# Patient Record
Sex: FEMALE | Race: WHITE | Employment: OTHER | URBAN - METROPOLITAN AREA
[De-identification: names, ages, dates, MRNs, and addresses within clinical notes are randomized per-mention and may not be internally consistent; named-entity substitution may affect disease eponyms.]

---

## 2024-03-28 ENCOUNTER — HOSPITAL ENCOUNTER (INPATIENT)
Age: 29
LOS: 13 days | Discharge: HOME | DRG: 750 | End: 2024-04-10
Attending: EMERGENCY MEDICINE | Admitting: PSYCHIATRY & NEUROLOGY
Payer: MEDICAID

## 2024-03-28 DIAGNOSIS — R45.851 DEPRESSION WITH SUICIDAL IDEATION: Primary | ICD-10-CM

## 2024-03-28 DIAGNOSIS — F32.A DEPRESSION WITH SUICIDAL IDEATION: Primary | ICD-10-CM

## 2024-03-28 PROBLEM — F25.1 SCHIZOAFFECTIVE DISORDER, DEPRESSIVE TYPE (HCC): Status: ACTIVE | Noted: 2024-03-28

## 2024-03-28 PROBLEM — K21.9 GASTROESOPHAGEAL REFLUX DISEASE WITHOUT ESOPHAGITIS: Status: ACTIVE | Noted: 2024-03-28

## 2024-03-28 PROBLEM — E66.9 OBESITY (BMI 30-39.9): Status: ACTIVE | Noted: 2024-03-28

## 2024-03-28 PROBLEM — K76.0 FATTY LIVER: Status: ACTIVE | Noted: 2024-03-28

## 2024-03-28 PROBLEM — R94.5 ABNORMAL LIVER FUNCTION: Status: ACTIVE | Noted: 2024-03-28

## 2024-03-28 PROBLEM — E03.8 OTHER SPECIFIED HYPOTHYROIDISM: Status: ACTIVE | Noted: 2024-03-28

## 2024-03-28 LAB
ALBUMIN SERPL-MCNC: 4.4 G/DL (ref 3.5–5.2)
ALP SERPL-CCNC: 91 U/L (ref 35–104)
ALT SERPL-CCNC: 61 U/L (ref 5–33)
AMPHET UR QL SCN: NEGATIVE
ANION GAP SERPL CALCULATED.3IONS-SCNC: 12 MMOL/L (ref 9–17)
AST SERPL-CCNC: 33 U/L
BARBITURATES UR QL SCN: NEGATIVE
BASOPHILS # BLD: 0.1 K/UL (ref 0–0.2)
BASOPHILS NFR BLD: 1 % (ref 0–2)
BENZODIAZ UR QL: NEGATIVE
BILIRUB SERPL-MCNC: 0.2 MG/DL (ref 0.3–1.2)
BUN SERPL-MCNC: 15 MG/DL (ref 6–20)
CALCIUM SERPL-MCNC: 9 MG/DL (ref 8.6–10.4)
CANNABINOIDS UR QL SCN: NEGATIVE
CHLORIDE SERPL-SCNC: 102 MMOL/L (ref 98–107)
CO2 SERPL-SCNC: 26 MMOL/L (ref 20–31)
COCAINE UR QL SCN: NEGATIVE
CREAT SERPL-MCNC: 0.7 MG/DL (ref 0.5–0.9)
EOSINOPHIL # BLD: 0.1 K/UL (ref 0–0.4)
EOSINOPHILS RELATIVE PERCENT: 1 % (ref 0–4)
ERYTHROCYTE [DISTWIDTH] IN BLOOD BY AUTOMATED COUNT: 13.4 % (ref 11.5–14.9)
ETHANOL PERCENT: <0.01 %
ETHANOLAMINE SERPL-MCNC: <10 MG/DL
FENTANYL UR QL: NEGATIVE
GFR SERPL CREATININE-BSD FRML MDRD: >90 ML/MIN/1.73M2
GLUCOSE SERPL-MCNC: 92 MG/DL (ref 70–99)
HCT VFR BLD AUTO: 40.3 % (ref 36–46)
HGB BLD-MCNC: 13 G/DL (ref 12–16)
LYMPHOCYTES NFR BLD: 2.1 K/UL (ref 1–4.8)
LYMPHOCYTES RELATIVE PERCENT: 22 % (ref 24–44)
MCH RBC QN AUTO: 26.1 PG (ref 26–34)
MCHC RBC AUTO-ENTMCNC: 32.2 G/DL (ref 31–37)
MCV RBC AUTO: 81.1 FL (ref 80–100)
METHADONE UR QL: NEGATIVE
MONOCYTES NFR BLD: 0.8 K/UL (ref 0.1–1.3)
MONOCYTES NFR BLD: 8 % (ref 1–7)
NEUTROPHILS NFR BLD: 68 % (ref 36–66)
NEUTS SEG NFR BLD: 6.6 K/UL (ref 1.3–9.1)
OPIATES UR QL SCN: NEGATIVE
OXYCODONE UR QL SCN: NEGATIVE
PCP UR QL SCN: NEGATIVE
PLATELET # BLD AUTO: 263 K/UL (ref 150–450)
PMV BLD AUTO: 7.5 FL (ref 6–12)
POTASSIUM SERPL-SCNC: 3.8 MMOL/L (ref 3.7–5.3)
PROT SERPL-MCNC: 7.5 G/DL (ref 6.4–8.3)
RBC # BLD AUTO: 4.98 M/UL (ref 4–5.2)
SODIUM SERPL-SCNC: 140 MMOL/L (ref 135–144)
TEST INFORMATION: NORMAL
WBC OTHER # BLD: 9.8 K/UL (ref 3.5–11)

## 2024-03-28 PROCEDURE — 99285 EMERGENCY DEPT VISIT HI MDM: CPT

## 2024-03-28 PROCEDURE — 90792 PSYCH DIAG EVAL W/MED SRVCS: CPT | Performed by: PSYCHIATRY & NEUROLOGY

## 2024-03-28 PROCEDURE — 80307 DRUG TEST PRSMV CHEM ANLYZR: CPT

## 2024-03-28 PROCEDURE — 80053 COMPREHEN METABOLIC PANEL: CPT

## 2024-03-28 PROCEDURE — 99222 1ST HOSP IP/OBS MODERATE 55: CPT | Performed by: INTERNAL MEDICINE

## 2024-03-28 PROCEDURE — 85025 COMPLETE CBC W/AUTO DIFF WBC: CPT

## 2024-03-28 PROCEDURE — GZHZZZZ GROUP PSYCHOTHERAPY: ICD-10-PCS | Performed by: PSYCHIATRY & NEUROLOGY

## 2024-03-28 PROCEDURE — GZ56ZZZ INDIVIDUAL PSYCHOTHERAPY, SUPPORTIVE: ICD-10-PCS | Performed by: PSYCHIATRY & NEUROLOGY

## 2024-03-28 PROCEDURE — 6370000000 HC RX 637 (ALT 250 FOR IP): Performed by: NURSE PRACTITIONER

## 2024-03-28 PROCEDURE — 1240000000 HC EMOTIONAL WELLNESS R&B

## 2024-03-28 PROCEDURE — G0480 DRUG TEST DEF 1-7 CLASSES: HCPCS

## 2024-03-28 PROCEDURE — 6370000000 HC RX 637 (ALT 250 FOR IP): Performed by: PSYCHIATRY & NEUROLOGY

## 2024-03-28 PROCEDURE — 36415 COLL VENOUS BLD VENIPUNCTURE: CPT

## 2024-03-28 PROCEDURE — 84443 ASSAY THYROID STIM HORMONE: CPT

## 2024-03-28 PROCEDURE — 84439 ASSAY OF FREE THYROXINE: CPT

## 2024-03-28 PROCEDURE — APPSS60 APP SPLIT SHARED TIME 46-60 MINUTES: Performed by: NURSE PRACTITIONER

## 2024-03-28 RX ORDER — CYCLOBENZAPRINE HCL 10 MG
10 TABLET ORAL 3 TIMES DAILY PRN
Status: ON HOLD | COMMUNITY
End: 2024-04-10

## 2024-03-28 RX ORDER — TRAZODONE HYDROCHLORIDE 50 MG/1
50 TABLET ORAL NIGHTLY PRN
Status: DISCONTINUED | OUTPATIENT
Start: 2024-03-28 | End: 2024-04-10 | Stop reason: HOSPADM

## 2024-03-28 RX ORDER — LEVOTHYROXINE SODIUM 0.05 MG/1
50 TABLET ORAL DAILY
Status: ON HOLD | COMMUNITY
End: 2024-04-09

## 2024-03-28 RX ORDER — OLANZAPINE 15 MG/1
15 TABLET ORAL NIGHTLY
Status: ON HOLD | COMMUNITY
End: 2024-04-09

## 2024-03-28 RX ORDER — DIPHENHYDRAMINE HYDROCHLORIDE 50 MG/ML
50 INJECTION INTRAMUSCULAR; INTRAVENOUS EVERY 4 HOURS PRN
Status: DISCONTINUED | OUTPATIENT
Start: 2024-03-28 | End: 2024-04-10 | Stop reason: HOSPADM

## 2024-03-28 RX ORDER — CYCLOBENZAPRINE HCL 10 MG
10 TABLET ORAL 3 TIMES DAILY PRN
Status: DISCONTINUED | OUTPATIENT
Start: 2024-03-28 | End: 2024-04-10 | Stop reason: HOSPADM

## 2024-03-28 RX ORDER — MAGNESIUM HYDROXIDE/ALUMINUM HYDROXICE/SIMETHICONE 120; 1200; 1200 MG/30ML; MG/30ML; MG/30ML
30 SUSPENSION ORAL EVERY 6 HOURS PRN
Status: DISCONTINUED | OUTPATIENT
Start: 2024-03-28 | End: 2024-04-10 | Stop reason: HOSPADM

## 2024-03-28 RX ORDER — TRAZODONE HYDROCHLORIDE 50 MG/1
50 TABLET ORAL NIGHTLY PRN
Status: ON HOLD | COMMUNITY
End: 2024-04-09

## 2024-03-28 RX ORDER — VITAMIN B COMPLEX
2000 TABLET ORAL EVERY MORNING
Status: DISCONTINUED | OUTPATIENT
Start: 2024-03-28 | End: 2024-04-10 | Stop reason: HOSPADM

## 2024-03-28 RX ORDER — ACETAMINOPHEN 325 MG/1
650 TABLET ORAL EVERY 6 HOURS PRN
Status: ON HOLD | COMMUNITY
End: 2024-04-10 | Stop reason: HOSPADM

## 2024-03-28 RX ORDER — LEVOTHYROXINE SODIUM 0.05 MG/1
50 TABLET ORAL DAILY
Status: DISCONTINUED | OUTPATIENT
Start: 2024-03-28 | End: 2024-04-10 | Stop reason: HOSPADM

## 2024-03-28 RX ORDER — HALOPERIDOL 5 MG/ML
5 INJECTION INTRAMUSCULAR EVERY 4 HOURS PRN
Status: DISCONTINUED | OUTPATIENT
Start: 2024-03-28 | End: 2024-04-10 | Stop reason: HOSPADM

## 2024-03-28 RX ORDER — SERTRALINE HYDROCHLORIDE 100 MG/1
100 TABLET, FILM COATED ORAL DAILY
Status: DISCONTINUED | OUTPATIENT
Start: 2024-03-28 | End: 2024-04-10 | Stop reason: HOSPADM

## 2024-03-28 RX ORDER — OLANZAPINE 10 MG/1
10 TABLET ORAL EVERY MORNING
Status: DISCONTINUED | OUTPATIENT
Start: 2024-03-29 | End: 2024-04-10 | Stop reason: HOSPADM

## 2024-03-28 RX ORDER — MULTIVIT-MIN/IRON/FOLIC ACID/K 18-600-40
1 CAPSULE ORAL EVERY MORNING
Status: ON HOLD | COMMUNITY
End: 2024-04-10 | Stop reason: HOSPADM

## 2024-03-28 RX ORDER — HYDROXYZINE 50 MG/1
50 TABLET, FILM COATED ORAL 3 TIMES DAILY PRN
Status: DISCONTINUED | OUTPATIENT
Start: 2024-03-28 | End: 2024-04-10 | Stop reason: HOSPADM

## 2024-03-28 RX ORDER — OMEPRAZOLE 20 MG/1
20 CAPSULE, DELAYED RELEASE ORAL DAILY
Status: ON HOLD | COMMUNITY
End: 2024-04-09

## 2024-03-28 RX ORDER — HALOPERIDOL 5 MG/1
5 TABLET ORAL EVERY 4 HOURS PRN
Status: DISCONTINUED | OUTPATIENT
Start: 2024-03-28 | End: 2024-04-10 | Stop reason: HOSPADM

## 2024-03-28 RX ORDER — POLYETHYLENE GLYCOL 3350 17 G/17G
17 POWDER, FOR SOLUTION ORAL DAILY PRN
Status: DISCONTINUED | OUTPATIENT
Start: 2024-03-28 | End: 2024-04-10 | Stop reason: HOSPADM

## 2024-03-28 RX ORDER — NORGESTIMATE AND ETHINYL ESTRADIOL 0.25-0.035
1 KIT ORAL DAILY
Status: DISCONTINUED | OUTPATIENT
Start: 2024-03-28 | End: 2024-03-29 | Stop reason: RX

## 2024-03-28 RX ORDER — SIMETHICONE 80 MG
80 TABLET,CHEWABLE ORAL 2 TIMES DAILY
Status: DISCONTINUED | OUTPATIENT
Start: 2024-03-28 | End: 2024-04-10 | Stop reason: HOSPADM

## 2024-03-28 RX ORDER — LIDOCAINE 4 G/G
1 PATCH TOPICAL DAILY
Status: DISCONTINUED | OUTPATIENT
Start: 2024-03-28 | End: 2024-04-10 | Stop reason: HOSPADM

## 2024-03-28 RX ORDER — OLANZAPINE 15 MG/1
15 TABLET ORAL NIGHTLY
Status: DISCONTINUED | OUTPATIENT
Start: 2024-03-28 | End: 2024-04-10 | Stop reason: HOSPADM

## 2024-03-28 RX ORDER — SERTRALINE HYDROCHLORIDE 100 MG/1
100 TABLET, FILM COATED ORAL DAILY
Status: ON HOLD | COMMUNITY
End: 2024-04-09

## 2024-03-28 RX ORDER — NORGESTIMATE AND ETHINYL ESTRADIOL 0.25-0.035
1 KIT ORAL DAILY
COMMUNITY

## 2024-03-28 RX ORDER — IBUPROFEN 400 MG/1
400 TABLET ORAL EVERY 6 HOURS PRN
Status: DISCONTINUED | OUTPATIENT
Start: 2024-03-28 | End: 2024-03-30

## 2024-03-28 RX ORDER — SIMETHICONE 80 MG
80 TABLET,CHEWABLE ORAL 2 TIMES DAILY
Status: ON HOLD | COMMUNITY
End: 2024-04-09

## 2024-03-28 RX ORDER — LORAZEPAM 2 MG/ML
2 INJECTION INTRAMUSCULAR EVERY 4 HOURS PRN
Status: DISCONTINUED | OUTPATIENT
Start: 2024-03-28 | End: 2024-04-04

## 2024-03-28 RX ORDER — LORAZEPAM 1 MG/1
2 TABLET ORAL EVERY 4 HOURS PRN
Status: DISCONTINUED | OUTPATIENT
Start: 2024-03-28 | End: 2024-04-04

## 2024-03-28 RX ORDER — OLANZAPINE 10 MG/1
10 TABLET ORAL EVERY MORNING
Status: ON HOLD | COMMUNITY
End: 2024-04-09

## 2024-03-28 RX ORDER — ACETAMINOPHEN 325 MG/1
650 TABLET ORAL EVERY 4 HOURS PRN
Status: DISCONTINUED | OUTPATIENT
Start: 2024-03-28 | End: 2024-04-10 | Stop reason: HOSPADM

## 2024-03-28 RX ORDER — BISACODYL 5 MG/1
5 TABLET, DELAYED RELEASE ORAL DAILY PRN
Status: ON HOLD | COMMUNITY
End: 2024-04-10 | Stop reason: HOSPADM

## 2024-03-28 RX ORDER — PANTOPRAZOLE SODIUM 40 MG/1
40 TABLET, DELAYED RELEASE ORAL
Status: DISCONTINUED | OUTPATIENT
Start: 2024-03-29 | End: 2024-04-10 | Stop reason: HOSPADM

## 2024-03-28 RX ADMIN — HYDROXYZINE HYDROCHLORIDE 50 MG: 50 TABLET, FILM COATED ORAL at 09:52

## 2024-03-28 RX ADMIN — HYDROXYZINE HYDROCHLORIDE 50 MG: 50 TABLET, FILM COATED ORAL at 20:49

## 2024-03-28 RX ADMIN — Medication 2000 UNITS: at 14:39

## 2024-03-28 RX ADMIN — LEVOTHYROXINE SODIUM 50 MCG: 0.05 TABLET ORAL at 14:39

## 2024-03-28 RX ADMIN — SERTRALINE 100 MG: 100 TABLET, FILM COATED ORAL at 17:32

## 2024-03-28 RX ADMIN — SIMETHICONE 80 MG: 80 TABLET, CHEWABLE ORAL at 20:52

## 2024-03-28 RX ADMIN — ACETAMINOPHEN 650 MG: 325 TABLET ORAL at 20:50

## 2024-03-28 RX ADMIN — OLANZAPINE 15 MG: 15 TABLET, FILM COATED ORAL at 20:52

## 2024-03-28 RX ADMIN — SIMETHICONE 80 MG: 80 TABLET, CHEWABLE ORAL at 14:40

## 2024-03-28 ASSESSMENT — PATIENT HEALTH QUESTIONNAIRE - PHQ9
2. FEELING DOWN, DEPRESSED OR HOPELESS: NOT AT ALL
SUM OF ALL RESPONSES TO PHQ QUESTIONS 1-9: 0
1. LITTLE INTEREST OR PLEASURE IN DOING THINGS: NOT AT ALL
SUM OF ALL RESPONSES TO PHQ QUESTIONS 1-9: 0
SUM OF ALL RESPONSES TO PHQ9 QUESTIONS 1 & 2: 0

## 2024-03-28 ASSESSMENT — LIFESTYLE VARIABLES
HOW MANY STANDARD DRINKS CONTAINING ALCOHOL DO YOU HAVE ON A TYPICAL DAY: PATIENT DOES NOT DRINK
HOW MANY STANDARD DRINKS CONTAINING ALCOHOL DO YOU HAVE ON A TYPICAL DAY: PATIENT DOES NOT DRINK
HOW OFTEN DO YOU HAVE A DRINK CONTAINING ALCOHOL: NEVER
HOW OFTEN DO YOU HAVE A DRINK CONTAINING ALCOHOL: NEVER

## 2024-03-28 ASSESSMENT — PAIN DESCRIPTION - DESCRIPTORS: DESCRIPTORS: ACHING;DISCOMFORT

## 2024-03-28 ASSESSMENT — PAIN DESCRIPTION - ORIENTATION: ORIENTATION: LOWER

## 2024-03-28 ASSESSMENT — PAIN - FUNCTIONAL ASSESSMENT
PAIN_FUNCTIONAL_ASSESSMENT: ACTIVITIES ARE NOT PREVENTED
PAIN_FUNCTIONAL_ASSESSMENT: NONE - DENIES PAIN

## 2024-03-28 ASSESSMENT — SLEEP AND FATIGUE QUESTIONNAIRES
AVERAGE NUMBER OF SLEEP HOURS: 6
DO YOU HAVE DIFFICULTY SLEEPING: NO
DO YOU USE A SLEEP AID: NO

## 2024-03-28 ASSESSMENT — PAIN SCALES - GENERAL
PAINLEVEL_OUTOF10: 3
PAINLEVEL_OUTOF10: 10

## 2024-03-28 ASSESSMENT — PAIN DESCRIPTION - LOCATION: LOCATION: BACK
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
## 2024-03-28 NOTE — BH NOTE
Patient is agitated as evidence of hitting self and banging on wall, writer attempted to help refocus agitation on other activities, patient remained calm, smiling and hitting self at times. writer offered PRN 's and talk time. Patient is in dayroom redirected with talk and laughing with staff

## 2024-03-28 NOTE — ED NOTES
Writer received phone call from guardian who provided verbal consent for inpatient admission to [facility].  Guardian aware of patient being placed on application for emergency admission.

## 2024-03-28 NOTE — BH NOTE
Emergency PRN Medication Administration Note:      Patient is Agitated as evidence by hitting self. Staff attempted to find and relieve the distress by Refocusing on new activity Patient is currently  in behavioral  control and coloring Medication Administered as prescribed: Atarax 50 mg PO Patient Tolerated medication administration.   Will continue to monitor, offer support, and reassess.

## 2024-03-28 NOTE — CARE COORDINATION
SOCIAL SERVICE NOTE:  e-mails patient admission paperwork to guardian Billant Rock at [email or web address]  For signature.  speaks with patient's care giver through [facility] [employer]. 24 hour staffing is through [employer], contact is Doris [phone] Ext [phone], requesting a 24 hour notice prior to discharge.

## 2024-03-28 NOTE — BH NOTE
Emergency Medication Follow-Up Note:    PRN medication of Atarax 50 mg  was effective as evidence by  Patient regain behavioral control, absence of behavior warranting emergency medication, socializing with peers. Patient denies medication side effects. Will continue to monitor and provide support as needed.
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
## 2024-03-28 NOTE — H&P
Department of Psychiatry  Attending Physician Psychiatric Assessment     Reason for Admission to Psychiatric Unit:  Threat to self requiring 24 hour professional observation  Threat to others requiring 24 hour professional observation  Command hallucinations directing harm to self or others; risk of the patient taking action  Failure of outpatient psychiatry treatment so that the beneficiary requires 24 hour professional observation and care  Concerns about patient's safety in the community    CHIEF COMPLAINT:  Suicidal and homicidal ideation with command auditory hallucinations    History obtained from: Patient, electronic medical record          HISTORY OF PRESENT ILLNESS:    Esmer Monzon is a 28 y.o. female who has a past medical history of schizoaffective disorder and intellectual disability. Patient presented to the ED [employer] application for emergency admission.  Documentation states that patient was experiencing command auditory hallucinations.  Making homicidal statements towards staff and was being aggressive.  Also cut her left forearm and has lacerations on this arm.  Patient unable to contract for safety in the community.  She does have a guardian, Jennifer Rock [phone].    On assessment, patient is sitting in the day area and is cooperative with discussion.  She exhibits significant thought blocking.  Has poor recall of recent and remote events.  Patient is oriented to self, month, year, and place.  She notes that she is admitted to the hospital after becoming distressed yesterday, banging on walls, throwing her shoes, and harming herself.  Patient unable to identify any specific trigger and states that she was acting on the voices that were commanding her to do these things.  Patient states that the voices are distressing, and have been telling her to end her life by wrapping a blanket around her neck.  Patient does note that she has attempted to harm herself in the past.  She is not able 
extremities      Physical Exam:     /88   Pulse 96   Temp 97.1 °F (36.2 °C) (Temporal)   Resp 14   Ht 1.626 m (5' 4.02\")   Wt 90.7 kg (200 lb)   SpO2 99%   BMI 34.31 kg/m²   Temp (24hrs), Av.7 °F (36.5 °C), Min:97.1 °F (36.2 °C), Max:98.2 °F (36.8 °C)    No results for input(s): \"POCGLU\" in the last 72 hours.  No intake or output data in the 24 hours ending 24 1617    General Appearance:  alert, well appearing, and in no acute distress  Mental status: oriented to person, place, and time   Head:  normocephalic, atraumatic.  Neck: supple, no carotid bruits, thyroid not palpable  Lungs: Bilateral equal air entry, clear to ausculation, no wheezing, rales or rhonchi, normal effort  Cardiovascular: normal rate, regular rhythm, no murmur, gallop, rub.  Abdomen: Soft, nontender, nondistended, normal bowel sounds, no hepatomegaly or splenomegaly  Neurologic: There are no new focal motor or sensory deficits, moving all extremities spontaneously   Skin: No gross lesions, rashes, bruising or bleeding on exposed skin area  Extremities:  peripheral pulses palpable, no pedal edema or calf pain with palpation    Investigations:      Laboratory Testing:  Recent Results (from the past 24 hour(s))   CBC with Auto Differential    Collection Time: 24  1:15 AM   Result Value Ref Range    WBC 9.8 3.5 - 11.0 k/uL    RBC 4.98 4.0 - 5.2 m/uL    Hemoglobin 13.0 12.0 - 16.0 g/dL    Hematocrit 40.3 36 - 46 %    MCV 81.1 80 - 100 fL    MCH 26.1 26 - 34 pg    MCHC 32.2 31 - 37 g/dL    RDW 13.4 11.5 - 14.9 %    Platelets 263 150 - 450 k/uL    MPV 7.5 6.0 - 12.0 fL    Neutrophils % 68 (H) 36 - 66 %    Lymphocytes % 22 (L) 24 - 44 %    Monocytes % 8 (H) 1 - 7 %    Eosinophils % 1 0 - 4 %    Basophils % 1 0 - 2 %    Neutrophils Absolute 6.60 1.3 - 9.1 k/uL    Lymphocytes Absolute 2.10 1.0 - 4.8 k/uL    Monocytes Absolute 0.80 0.1 - 1.3 k/uL    Eosinophils Absolute 0.10 0.0 - 0.4 k/uL    Basophils Absolute 0.10 0.0 - 0.2
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
## 2024-03-28 NOTE — BH NOTE
Patient arrived to [facility] unit via wheelchair accompanied by two [facility] staff. Patient oriented to unit/unit policies. Patient verbalized understanding of orientation. Nourishment offered to patient.
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
## 2024-03-28 NOTE — CARE COORDINATION
SOCIAL SERVICE NOTE:  attempted to reach patient's guardian Jennifer Rock at [phone] and the voice mail was full.  is attempting to get the fax number or e-mail address to send admission paperwork to for guardian's signature.

## 2024-03-29 LAB
T4 FREE SERPL-MCNC: 1.3 NG/DL (ref 0.9–1.7)
TSH SERPL DL<=0.05 MIU/L-ACNC: 6.37 UIU/ML (ref 0.3–5)

## 2024-03-29 PROCEDURE — APPSS30 APP SPLIT SHARED TIME 16-30 MINUTES: Performed by: NURSE PRACTITIONER

## 2024-03-29 PROCEDURE — 6370000000 HC RX 637 (ALT 250 FOR IP): Performed by: NURSE PRACTITIONER

## 2024-03-29 PROCEDURE — 6370000000 HC RX 637 (ALT 250 FOR IP): Performed by: PSYCHIATRY & NEUROLOGY

## 2024-03-29 PROCEDURE — 1240000000 HC EMOTIONAL WELLNESS R&B

## 2024-03-29 PROCEDURE — 99232 SBSQ HOSP IP/OBS MODERATE 35: CPT | Performed by: PSYCHIATRY & NEUROLOGY

## 2024-03-29 PROCEDURE — 99232 SBSQ HOSP IP/OBS MODERATE 35: CPT | Performed by: INTERNAL MEDICINE

## 2024-03-29 RX ADMIN — SIMETHICONE 80 MG: 80 TABLET, CHEWABLE ORAL at 08:40

## 2024-03-29 RX ADMIN — SIMETHICONE 80 MG: 80 TABLET, CHEWABLE ORAL at 20:39

## 2024-03-29 RX ADMIN — OLANZAPINE 10 MG: 10 TABLET, FILM COATED ORAL at 08:40

## 2024-03-29 RX ADMIN — Medication 2000 UNITS: at 08:40

## 2024-03-29 RX ADMIN — CYCLOBENZAPRINE 10 MG: 10 TABLET, FILM COATED ORAL at 08:40

## 2024-03-29 RX ADMIN — LEVOTHYROXINE SODIUM 50 MCG: 0.05 TABLET ORAL at 06:07

## 2024-03-29 RX ADMIN — PANTOPRAZOLE SODIUM 40 MG: 40 TABLET, DELAYED RELEASE ORAL at 06:07

## 2024-03-29 RX ADMIN — CYCLOBENZAPRINE 10 MG: 10 TABLET, FILM COATED ORAL at 20:39

## 2024-03-29 RX ADMIN — ACETAMINOPHEN 650 MG: 325 TABLET ORAL at 15:36

## 2024-03-29 RX ADMIN — HYDROXYZINE HYDROCHLORIDE 50 MG: 50 TABLET, FILM COATED ORAL at 20:39

## 2024-03-29 RX ADMIN — TRAZODONE HYDROCHLORIDE 50 MG: 50 TABLET ORAL at 20:39

## 2024-03-29 RX ADMIN — OLANZAPINE 15 MG: 15 TABLET, FILM COATED ORAL at 20:39

## 2024-03-29 RX ADMIN — SERTRALINE 100 MG: 100 TABLET, FILM COATED ORAL at 08:40

## 2024-03-29 ASSESSMENT — PAIN DESCRIPTION - LOCATION
LOCATION: BACK

## 2024-03-29 ASSESSMENT — PAIN DESCRIPTION - ORIENTATION
ORIENTATION: LOWER
ORIENTATION: MID;LEFT

## 2024-03-29 ASSESSMENT — PAIN SCALES - GENERAL
PAINLEVEL_OUTOF10: 6
PAINLEVEL_OUTOF10: 3
PAINLEVEL_OUTOF10: 5
PAINLEVEL_OUTOF10: 9

## 2024-03-30 PROCEDURE — 6370000000 HC RX 637 (ALT 250 FOR IP): Performed by: PSYCHIATRY & NEUROLOGY

## 2024-03-30 PROCEDURE — 99232 SBSQ HOSP IP/OBS MODERATE 35: CPT | Performed by: NURSE PRACTITIONER

## 2024-03-30 PROCEDURE — 1240000000 HC EMOTIONAL WELLNESS R&B

## 2024-03-30 PROCEDURE — 99231 SBSQ HOSP IP/OBS SF/LOW 25: CPT | Performed by: INTERNAL MEDICINE

## 2024-03-30 PROCEDURE — 6370000000 HC RX 637 (ALT 250 FOR IP): Performed by: NURSE PRACTITIONER

## 2024-03-30 RX ORDER — LIDOCAINE 4 G/G
1 PATCH TOPICAL ONCE
Status: DISCONTINUED | OUTPATIENT
Start: 2024-03-30 | End: 2024-03-30 | Stop reason: SDUPTHER

## 2024-03-30 RX ADMIN — ACETAMINOPHEN 650 MG: 325 TABLET ORAL at 12:02

## 2024-03-30 RX ADMIN — PANTOPRAZOLE SODIUM 40 MG: 40 TABLET, DELAYED RELEASE ORAL at 05:54

## 2024-03-30 RX ADMIN — TRAZODONE HYDROCHLORIDE 50 MG: 50 TABLET ORAL at 23:01

## 2024-03-30 RX ADMIN — ACETAMINOPHEN 650 MG: 325 TABLET ORAL at 20:09

## 2024-03-30 RX ADMIN — OLANZAPINE 10 MG: 10 TABLET, FILM COATED ORAL at 08:14

## 2024-03-30 RX ADMIN — OLANZAPINE 15 MG: 15 TABLET, FILM COATED ORAL at 23:02

## 2024-03-30 RX ADMIN — HYDROXYZINE HYDROCHLORIDE 50 MG: 50 TABLET, FILM COATED ORAL at 23:01

## 2024-03-30 RX ADMIN — SIMETHICONE 80 MG: 80 TABLET, CHEWABLE ORAL at 08:13

## 2024-03-30 RX ADMIN — SERTRALINE 100 MG: 100 TABLET, FILM COATED ORAL at 08:13

## 2024-03-30 RX ADMIN — HYDROXYZINE HYDROCHLORIDE 50 MG: 50 TABLET, FILM COATED ORAL at 11:23

## 2024-03-30 RX ADMIN — LEVOTHYROXINE SODIUM 50 MCG: 0.05 TABLET ORAL at 05:54

## 2024-03-30 RX ADMIN — SIMETHICONE 80 MG: 80 TABLET, CHEWABLE ORAL at 23:01

## 2024-03-30 RX ADMIN — Medication 2000 UNITS: at 08:13

## 2024-03-30 ASSESSMENT — PAIN SCALES - GENERAL
PAINLEVEL_OUTOF10: 5
PAINLEVEL_OUTOF10: 9
PAINLEVEL_OUTOF10: 3
PAINLEVEL_OUTOF10: 5
PAINLEVEL_OUTOF10: 0

## 2024-03-30 ASSESSMENT — PAIN DESCRIPTION - ORIENTATION: ORIENTATION: MID

## 2024-03-30 ASSESSMENT — PAIN DESCRIPTION - DESCRIPTORS: DESCRIPTORS: ACHING;STABBING

## 2024-03-30 ASSESSMENT — PAIN DESCRIPTION - LOCATION
LOCATION: BACK
LOCATION: BACK

## 2024-03-30 ASSESSMENT — PAIN - FUNCTIONAL ASSESSMENT: PAIN_FUNCTIONAL_ASSESSMENT: ACTIVITIES ARE NOT PREVENTED

## 2024-03-31 PROCEDURE — 1240000000 HC EMOTIONAL WELLNESS R&B

## 2024-03-31 PROCEDURE — APPSS30 APP SPLIT SHARED TIME 16-30 MINUTES: Performed by: NURSE PRACTITIONER

## 2024-03-31 PROCEDURE — 6370000000 HC RX 637 (ALT 250 FOR IP): Performed by: PSYCHIATRY & NEUROLOGY

## 2024-03-31 PROCEDURE — 6370000000 HC RX 637 (ALT 250 FOR IP): Performed by: NURSE PRACTITIONER

## 2024-03-31 PROCEDURE — 99232 SBSQ HOSP IP/OBS MODERATE 35: CPT | Performed by: PSYCHIATRY & NEUROLOGY

## 2024-03-31 RX ADMIN — PANTOPRAZOLE SODIUM 40 MG: 40 TABLET, DELAYED RELEASE ORAL at 06:57

## 2024-03-31 RX ADMIN — TRAZODONE HYDROCHLORIDE 50 MG: 50 TABLET ORAL at 20:56

## 2024-03-31 RX ADMIN — CYCLOBENZAPRINE 10 MG: 10 TABLET, FILM COATED ORAL at 15:03

## 2024-03-31 RX ADMIN — SIMETHICONE 80 MG: 80 TABLET, CHEWABLE ORAL at 08:01

## 2024-03-31 RX ADMIN — OLANZAPINE 10 MG: 10 TABLET, FILM COATED ORAL at 08:01

## 2024-03-31 RX ADMIN — HYDROXYZINE HYDROCHLORIDE 50 MG: 50 TABLET, FILM COATED ORAL at 20:56

## 2024-03-31 RX ADMIN — OLANZAPINE 15 MG: 15 TABLET, FILM COATED ORAL at 20:55

## 2024-03-31 RX ADMIN — ACETAMINOPHEN 650 MG: 325 TABLET ORAL at 15:03

## 2024-03-31 RX ADMIN — LEVOTHYROXINE SODIUM 50 MCG: 0.05 TABLET ORAL at 06:58

## 2024-03-31 RX ADMIN — SERTRALINE 100 MG: 100 TABLET, FILM COATED ORAL at 08:01

## 2024-03-31 RX ADMIN — ACETAMINOPHEN 650 MG: 325 TABLET ORAL at 06:58

## 2024-03-31 RX ADMIN — Medication 2000 UNITS: at 08:01

## 2024-03-31 RX ADMIN — SIMETHICONE 80 MG: 80 TABLET, CHEWABLE ORAL at 20:55

## 2024-03-31 ASSESSMENT — PAIN SCALES - GENERAL
PAINLEVEL_OUTOF10: 4
PAINLEVEL_OUTOF10: 0
PAINLEVEL_OUTOF10: 6

## 2024-03-31 ASSESSMENT — LIFESTYLE VARIABLES: HOW OFTEN DO YOU HAVE A DRINK CONTAINING ALCOHOL: NEVER

## 2024-03-31 NOTE — BH NOTE
Pt wrote in journal she wants to stab herself because she is so angry. Pt angry d/t not being able to get in touch with someone on the phone. After showing writer the note, items that could be  used were removed I.e. multiple pencils, removed. Pt felt better after finally getting a hold of cousin & writer did a puzzle with pt. Pt was calm and cooperative after and ready for bed.

## 2024-03-31 NOTE — BH NOTE
Patient reports allergy to Ibuprofen, but unable to provide information about type of reaction. Attempted to contact Legal Guardian, Jennifer Rock at [phone], but voicemail is full. Patient provided another , Juliet (Legal guardian's daughter) [phone], called and left a voicemail, awaiting response.   On-call Internal Med provider, notified about the reported drug allergy. Provider discontinued Ibuprofen.

## 2024-03-31 NOTE — BH NOTE
Spoke to Legal Guardian, Jennifer Rock, to confirm patient's allergy to Ibuprofen and reported another allergy--Robitussin. Patient's record of Allergies updated.

## 2024-04-01 PROCEDURE — 1240000000 HC EMOTIONAL WELLNESS R&B

## 2024-04-01 PROCEDURE — APPSS30 APP SPLIT SHARED TIME 16-30 MINUTES: Performed by: NURSE PRACTITIONER

## 2024-04-01 PROCEDURE — 6370000000 HC RX 637 (ALT 250 FOR IP): Performed by: PSYCHIATRY & NEUROLOGY

## 2024-04-01 PROCEDURE — 99232 SBSQ HOSP IP/OBS MODERATE 35: CPT | Performed by: PSYCHIATRY & NEUROLOGY

## 2024-04-01 PROCEDURE — 6360000002 HC RX W HCPCS: Performed by: PSYCHIATRY & NEUROLOGY

## 2024-04-01 PROCEDURE — 6370000000 HC RX 637 (ALT 250 FOR IP): Performed by: NURSE PRACTITIONER

## 2024-04-01 RX ADMIN — OLANZAPINE 15 MG: 15 TABLET, FILM COATED ORAL at 21:23

## 2024-04-01 RX ADMIN — HYDROXYZINE HYDROCHLORIDE 50 MG: 50 TABLET, FILM COATED ORAL at 21:23

## 2024-04-01 RX ADMIN — TRAZODONE HYDROCHLORIDE 50 MG: 50 TABLET ORAL at 21:23

## 2024-04-01 RX ADMIN — SIMETHICONE 80 MG: 80 TABLET, CHEWABLE ORAL at 08:41

## 2024-04-01 RX ADMIN — SERTRALINE 100 MG: 100 TABLET, FILM COATED ORAL at 08:41

## 2024-04-01 RX ADMIN — Medication 2000 UNITS: at 08:41

## 2024-04-01 RX ADMIN — PANTOPRAZOLE SODIUM 40 MG: 40 TABLET, DELAYED RELEASE ORAL at 06:15

## 2024-04-01 RX ADMIN — HYDROXYZINE HYDROCHLORIDE 50 MG: 50 TABLET, FILM COATED ORAL at 13:01

## 2024-04-01 RX ADMIN — OLANZAPINE 10 MG: 10 TABLET, FILM COATED ORAL at 08:41

## 2024-04-01 RX ADMIN — LEVOTHYROXINE SODIUM 50 MCG: 0.05 TABLET ORAL at 06:15

## 2024-04-01 RX ADMIN — CYCLOBENZAPRINE 10 MG: 10 TABLET, FILM COATED ORAL at 21:23

## 2024-04-01 RX ADMIN — DIPHENHYDRAMINE HYDROCHLORIDE 50 MG: 50 INJECTION, SOLUTION INTRAMUSCULAR; INTRAVENOUS at 17:36

## 2024-04-01 RX ADMIN — HALOPERIDOL LACTATE 5 MG: 5 INJECTION, SOLUTION INTRAMUSCULAR at 17:36

## 2024-04-01 RX ADMIN — LORAZEPAM 2 MG: 2 INJECTION INTRAMUSCULAR; INTRAVENOUS at 17:36

## 2024-04-01 ASSESSMENT — PAIN DESCRIPTION - LOCATION: LOCATION: BACK

## 2024-04-01 ASSESSMENT — PAIN SCALES - GENERAL: PAINLEVEL_OUTOF10: 3

## 2024-04-01 ASSESSMENT — PAIN DESCRIPTION - DESCRIPTORS: DESCRIPTORS: ACHING;DISCOMFORT;TIGHTNESS

## 2024-04-01 ASSESSMENT — PAIN DESCRIPTION - ORIENTATION: ORIENTATION: MID;RIGHT;LEFT

## 2024-04-01 NOTE — CARE COORDINATION
Social Work attempted to contact guardidean Rodriguez ([phone]); did not answer and voicemail is full.    Social Work contacted Doris at [facility] ([phone] Ext.[phone]). Doris stated that the patient will no longer be at [facility] and will be moving to a [facility] Group home; gave number and said to call them regarding care.    Social work then called [facility] and was transferred to  Lucinda ([phone]); left voicemail.

## 2024-04-01 NOTE — BH NOTE
INITIATION OF SECLUSION:     Initiation of seclusion at 1948 due to dangerous behaviors. Pt was punching walls, swinging fists at peers, placed untied blanket around shoulders without signs of distress, and digging nails in skin. Staff attempted 1:1 talk time, verbal redirection, and quiet time. Pt refused and continue to escalate. Provider notified via [employer] and advocate present. 1:1 staff present and pt tolerating well.

## 2024-04-02 PROCEDURE — 6370000000 HC RX 637 (ALT 250 FOR IP): Performed by: NURSE PRACTITIONER

## 2024-04-02 PROCEDURE — 6370000000 HC RX 637 (ALT 250 FOR IP): Performed by: PSYCHIATRY & NEUROLOGY

## 2024-04-02 PROCEDURE — 6360000002 HC RX W HCPCS: Performed by: PSYCHIATRY & NEUROLOGY

## 2024-04-02 PROCEDURE — APPSS30 APP SPLIT SHARED TIME 16-30 MINUTES: Performed by: NURSE PRACTITIONER

## 2024-04-02 PROCEDURE — 99232 SBSQ HOSP IP/OBS MODERATE 35: CPT | Performed by: PSYCHIATRY & NEUROLOGY

## 2024-04-02 PROCEDURE — 1240000000 HC EMOTIONAL WELLNESS R&B

## 2024-04-02 RX ADMIN — SIMETHICONE 80 MG: 80 TABLET, CHEWABLE ORAL at 21:52

## 2024-04-02 RX ADMIN — PANTOPRAZOLE SODIUM 40 MG: 40 TABLET, DELAYED RELEASE ORAL at 07:09

## 2024-04-02 RX ADMIN — TRAZODONE HYDROCHLORIDE 50 MG: 50 TABLET ORAL at 21:52

## 2024-04-02 RX ADMIN — OLANZAPINE 10 MG: 10 TABLET, FILM COATED ORAL at 09:10

## 2024-04-02 RX ADMIN — SIMETHICONE 80 MG: 80 TABLET, CHEWABLE ORAL at 09:10

## 2024-04-02 RX ADMIN — HALOPERIDOL LACTATE 5 MG: 5 INJECTION, SOLUTION INTRAMUSCULAR at 12:56

## 2024-04-02 RX ADMIN — LEVOTHYROXINE SODIUM 50 MCG: 0.05 TABLET ORAL at 07:09

## 2024-04-02 RX ADMIN — DIPHENHYDRAMINE HYDROCHLORIDE 50 MG: 50 INJECTION, SOLUTION INTRAMUSCULAR; INTRAVENOUS at 18:33

## 2024-04-02 RX ADMIN — OLANZAPINE 15 MG: 15 TABLET, FILM COATED ORAL at 21:52

## 2024-04-02 RX ADMIN — HALOPERIDOL LACTATE 5 MG: 5 INJECTION, SOLUTION INTRAMUSCULAR at 18:33

## 2024-04-02 RX ADMIN — DIPHENHYDRAMINE HYDROCHLORIDE 50 MG: 50 INJECTION, SOLUTION INTRAMUSCULAR; INTRAVENOUS at 12:56

## 2024-04-02 RX ADMIN — LORAZEPAM 2 MG: 2 INJECTION INTRAMUSCULAR; INTRAVENOUS at 12:56

## 2024-04-02 RX ADMIN — LORAZEPAM 2 MG: 2 INJECTION INTRAMUSCULAR; INTRAVENOUS at 18:33

## 2024-04-02 RX ADMIN — SERTRALINE 100 MG: 100 TABLET, FILM COATED ORAL at 09:10

## 2024-04-02 RX ADMIN — Medication 2000 UNITS: at 09:10

## 2024-04-02 RX ADMIN — HYDROXYZINE HYDROCHLORIDE 50 MG: 50 TABLET, FILM COATED ORAL at 21:52

## 2024-04-02 RX ADMIN — CYCLOBENZAPRINE 10 MG: 10 TABLET, FILM COATED ORAL at 21:52

## 2024-04-02 NOTE — BH NOTE
Post Restraint and Seclusion Patient Debriefing    Did debriefing occur? yes, staff and charge nurse debriefed upon discontinuation of seclusion. MD notified.    If No, why not?  [] Patient refused  [] Patient is unavailable for debriefing due to:  [] Patient debriefing not completed due to clinical contraindication of:    What events led to the seclusion/restraint incident?      Did being restrained or in seclusion help you regain control of your behavior? yes, patient was able to reflect on behaviors that led to seclusion      Did you feel safe while you were in restraint or seclusion:      [x] Very Safe  [] Safe  [] Somewhat Safe  [] Not Safe     Did you have the chance to gain control of your behavior before you were secluded or restrained? no    If Yes, how:    [] Offered Medication  [] Talked with  [] Given Time Out      [] Offered alternatives to restraint/seclusion     During the restraint or seclusion process were you offered medicine to help you gain control? yes, Atarax PO 50mg offered      Were your physical and emotional needs met, and your privacy rights addressed while you were in restraints/seclusion? yes, food and the restroom offered with alternative options for distraction      How can we assist you in remaining restraint or seclusion free in the future?  Patient refused to answer.    Is there anything else you would like to share regarding this restraint/seclusion episode?  It was helpful.    Patient consented to family or significant other to participate in debriefing yes, guardian notified    Patient's guardian participated in debriefing (when applicable) no    Patient's guardian unable to participate in debriefing (when applicable) yes, voicemail full for guardian's phone. Secure email sent to guardian.    Staff:  Were modifications to the treatment plan completed? yes, patient placed as a line of sight as well as offered food, medications and the television.

## 2024-04-02 NOTE — BH NOTE
BEHAVIORAL SERVICES: One - Hour In- Person Review  For Management of Violent or Self - Destructive Behavior    Seclusion/Restraint:  physical hold times 2, seclusion, 4 point restraints.     Reason for Intervention: Patient agitated and disruptive as evidenced by pounding on the walls with her fist, throwing items in her room, kicking her bed, and slamming bathroom door. Physical hold required to transport patient to seclusion room, then to restraint room.     Response to Intervention:  Patient not cooperative with interventions, crying, kicking, attempting to place self on floor. Engaged in self harm acts that required 4 point restraints.     Medical Record reviewed and discussed precipitating events/behaviors with RN initiating Intervention:  Yes    Patient Medical Status: unable to assess due to patient behaviors.   Vital Signs:   Respiratory Status:   Circulatory Status:   Skin Integrity:    Orientation: oriented to person, place, time/date, situation, day of week, month of year, and year    Mood/Affect: angry, anxious, irritable, labile, and sad    Speech: Loud    Thought Content: patient engaged in self harm behaviors.     Thought Processes: Goal-Directed    Rationale for continued use of intervention: patient continued to engage in self harming behaviors, refused to redirect, unable to re-gain behavioral control.     Rationale for discontinuing intervention: Patient is able to discontinue self harming behavior, and regain behavorial control.     One Hour Review Evaluation Physician Notification:  completed.

## 2024-04-02 NOTE — BH NOTE
SECLUSION DISCONTINUED NOTE:  Patient walked out of seclusion with staff and tolerated well. MD and charge nurse notified. Patient remains free from violent behaviors at this time. Patient has been agreeable to other distractions and remains cooperative with staff and other patients. Staff will continue to monitor patient for warning signs that may lead to aggression and/or violent behaviors. Staff ensures safety by providing safety checks on the unit intermittently and every 15 minutes. Staff continues to provide a safe environment.

## 2024-04-02 NOTE — CARE COORDINATION
Social Work received voicemail from Sandy Butler ([phone]) from [employer] (patient's [employer]).    Sandy confirms she is going to [facility] and that [facility] is willing to provide transportation after Thursday, 4/4, as long as they have significant advanced notice. States that the nurse Jhonathan Rose ([phone]) will need a copy of the after visit summary and the medication list.
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
## 2024-04-02 NOTE — BH NOTE
Voicemail left for guardian to notify of patient getting IM PRN medications and transferred to another unit.

## 2024-04-02 NOTE — BH NOTE
Another nurse was able to get an IV in.  Antibiotics given.    Patient agitated and disruptive as evidenced by pounding on the walls with her fist, throwing items in her room, kicking her bed, and slamming bathroom door. Staff attempted to redirect patient by offering one to one talk time, refocusing on a new activity, and PRN medications. After attempting verbal redirection multiple times by 3 different staff members patient given IM PRN Haldol 5mg, Ativan 2mg, and benadryl 50mg without any holds at 1256. Patient encouraged to rest in room. Staff stayed outside patient room with door open. Patient continued to pound on walls and kick her bed. Patient also began digging in her arm and hand with her nails. Staff again attempted to redirect patient verbally. Patient then took blanket and wrapped it around her neck. CIT called. Patient not accepting of redirection or one to one time and walked to seclusion room at 1315 via physical hold of 120 seconds.Staff removed patient's personal clothing and patient was given hospital attire due to attempt to wrap blanket around neck. Locked seclusion began at 1321. Criteria for discontinuation explained, no evidence of learning. While in seclusion patient began to bang her head on the wall. Seclusion discontinued at 1326. Patient taken to restraint room via physical hold at 1327- 1330 for 180 seconds and patient placed into hard 4-point restraints starting at 1331. Criteria for discontinuation explained, no evidence of learning. Patient advocate present throughout this time, on call provider notified and gave orders. Patients guardian notified at 1422.

## 2024-04-02 NOTE — BH NOTE
Emergency PRN Medication Administration Note:      Patient is Agitated and Disruptive as evidence by pounding on walls, attempting to wrap sheets around neck, and demanding to use the phone. Staff attempted to find and relieve the distress by Talking to patient, Refocusing on new activity, Offering suggestions, and Administer PRN medications Patient is currently  continuing to escalate. Patient educated by 3 staff that continuation of behaviors would result in requiring restraints again, patient smiled in response. Medication Administered as prescribed: IM PRN haldol 5mg, benadryl 50mg, and ativan 2mg. Patient Tolerated medication administration.   Transfer order placed for transfer to Stepdown room 101.

## 2024-04-02 NOTE — BH NOTE
Discontinuation of restraints  Restraints removed at this time for a total time of 105 minutes Patient able to verbalize and demonstrate criteria for discontinuation and reports she will stay free of self harm.
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
## 2024-04-02 NOTE — BH NOTE
Patient remains in seclusion at this time with staff 1:1. Patient continues to inflict injury to self by pulling and pinching at her skin while kicking the floor with both feet. Staff continues to educate patient on communication to reduce frustration causing self harm. Patient denies any pain at this time. Patient denies the need to void at this time. Staff continues to monitor and offer education to patient regarding safety to self and others on the unit. RN notified.

## 2024-04-02 NOTE — BH NOTE
Call placed to guardian to notify of patient's order for seclusion and line of sight, voicemail full at this time. Secure email sent to notify and request a return call when available.

## 2024-04-03 PROCEDURE — 6370000000 HC RX 637 (ALT 250 FOR IP): Performed by: PSYCHIATRY & NEUROLOGY

## 2024-04-03 PROCEDURE — 1240000000 HC EMOTIONAL WELLNESS R&B

## 2024-04-03 PROCEDURE — 99232 SBSQ HOSP IP/OBS MODERATE 35: CPT | Performed by: PSYCHIATRY & NEUROLOGY

## 2024-04-03 PROCEDURE — APPSS30 APP SPLIT SHARED TIME 16-30 MINUTES: Performed by: NURSE PRACTITIONER

## 2024-04-03 PROCEDURE — 6370000000 HC RX 637 (ALT 250 FOR IP): Performed by: NURSE PRACTITIONER

## 2024-04-03 RX ADMIN — OLANZAPINE 10 MG: 10 TABLET, FILM COATED ORAL at 09:34

## 2024-04-03 RX ADMIN — SIMETHICONE 80 MG: 80 TABLET, CHEWABLE ORAL at 09:34

## 2024-04-03 RX ADMIN — HYDROXYZINE HYDROCHLORIDE 50 MG: 50 TABLET, FILM COATED ORAL at 22:13

## 2024-04-03 RX ADMIN — PANTOPRAZOLE SODIUM 40 MG: 40 TABLET, DELAYED RELEASE ORAL at 06:11

## 2024-04-03 RX ADMIN — LEVOTHYROXINE SODIUM 50 MCG: 0.05 TABLET ORAL at 06:11

## 2024-04-03 RX ADMIN — ALUMINUM HYDROXIDE, MAGNESIUM HYDROXIDE, AND SIMETHICONE 30 ML: 1200; 120; 1200 SUSPENSION ORAL at 20:04

## 2024-04-03 RX ADMIN — OLANZAPINE 15 MG: 15 TABLET, FILM COATED ORAL at 22:12

## 2024-04-03 RX ADMIN — SIMETHICONE 80 MG: 80 TABLET, CHEWABLE ORAL at 22:13

## 2024-04-03 RX ADMIN — HYDROXYZINE HYDROCHLORIDE 50 MG: 50 TABLET, FILM COATED ORAL at 14:19

## 2024-04-03 RX ADMIN — HALOPERIDOL 5 MG: 5 TABLET ORAL at 19:14

## 2024-04-03 RX ADMIN — SERTRALINE 100 MG: 100 TABLET, FILM COATED ORAL at 09:34

## 2024-04-03 RX ADMIN — Medication 2000 UNITS: at 09:34

## 2024-04-03 RX ADMIN — ACETAMINOPHEN 650 MG: 325 TABLET ORAL at 19:14

## 2024-04-03 RX ADMIN — TRAZODONE HYDROCHLORIDE 50 MG: 50 TABLET ORAL at 22:13

## 2024-04-03 ASSESSMENT — LIFESTYLE VARIABLES
HOW OFTEN DO YOU HAVE A DRINK CONTAINING ALCOHOL: NEVER
HOW MANY STANDARD DRINKS CONTAINING ALCOHOL DO YOU HAVE ON A TYPICAL DAY: PATIENT DOES NOT DRINK

## 2024-04-03 ASSESSMENT — PAIN SCALES - GENERAL
PAINLEVEL_OUTOF10: 3
PAINLEVEL_OUTOF10: 0
PAINLEVEL_OUTOF10: 3
PAINLEVEL_OUTOF10: 3

## 2024-04-03 ASSESSMENT — PAIN DESCRIPTION - LOCATION
LOCATION: CHEST
LOCATION: THROAT

## 2024-04-03 ASSESSMENT — PAIN DESCRIPTION - DESCRIPTORS: DESCRIPTORS: DISCOMFORT;BURNING

## 2024-04-03 NOTE — BH NOTE
Atarax 50mg PO PRN given for increased anxiety and feeling restless. Patient was offered 1:1 talk time, quiet time and diversions. Patient is accepting of medication.

## 2024-04-03 NOTE — BH NOTE
Pt agitated AEB: PT increase in anxiety, rocking, and pinching self. Staff tried talking with pt tried coloring and playing cards. PT received PRN atarax and stated it was ineffective. Pt was offered PO or IM medications. PT accepting of PO haldol 5mg will continue to monitor. Pt remains a 1:1 for pt safety.

## 2024-04-03 NOTE — CARE COORDINATION
Social work returned call to patient EFREN Butler from the [employer]. Sandy is questioning when patient will be discharge so transportation can be arranged along with the current caregivers bringing the patients medications to the hospital for the staff at [facility]. Sandy shared they will not be able to transport on the weekend, and if there is a plan to discharge on Monday then Sandy would need to know on Friday. Sandy reports the [facility] is aware and understand the patients behaviors and they are able to handle her accordingly. Social work agreed to keep Sandy updated with the discharge plan once it has been decided.

## 2024-04-04 PROCEDURE — 99232 SBSQ HOSP IP/OBS MODERATE 35: CPT | Performed by: PSYCHIATRY & NEUROLOGY

## 2024-04-04 PROCEDURE — 2500000003 HC RX 250 WO HCPCS: Performed by: PSYCHIATRY & NEUROLOGY

## 2024-04-04 PROCEDURE — 6370000000 HC RX 637 (ALT 250 FOR IP): Performed by: NURSE PRACTITIONER

## 2024-04-04 PROCEDURE — APPSS30 APP SPLIT SHARED TIME 16-30 MINUTES: Performed by: NURSE PRACTITIONER

## 2024-04-04 PROCEDURE — 6360000002 HC RX W HCPCS: Performed by: PSYCHIATRY & NEUROLOGY

## 2024-04-04 PROCEDURE — 1240000000 HC EMOTIONAL WELLNESS R&B

## 2024-04-04 PROCEDURE — 6370000000 HC RX 637 (ALT 250 FOR IP): Performed by: PSYCHIATRY & NEUROLOGY

## 2024-04-04 PROCEDURE — 2580000003 HC RX 258: Performed by: PSYCHIATRY & NEUROLOGY

## 2024-04-04 RX ORDER — CHLORPROMAZINE HYDROCHLORIDE 25 MG/ML
50 INJECTION INTRAMUSCULAR ONCE
Status: COMPLETED | OUTPATIENT
Start: 2024-04-04 | End: 2024-04-04

## 2024-04-04 RX ORDER — DIVALPROEX SODIUM 500 MG/1
500 TABLET, EXTENDED RELEASE ORAL 2 TIMES DAILY
Status: DISCONTINUED | OUTPATIENT
Start: 2024-04-04 | End: 2024-04-05

## 2024-04-04 RX ADMIN — OLANZAPINE 15 MG: 15 TABLET, FILM COATED ORAL at 21:12

## 2024-04-04 RX ADMIN — DIVALPROEX SODIUM 500 MG: 500 TABLET, EXTENDED RELEASE ORAL at 21:12

## 2024-04-04 RX ADMIN — LORAZEPAM 2 MG: 2 INJECTION INTRAMUSCULAR; INTRAVENOUS at 10:50

## 2024-04-04 RX ADMIN — LEVOTHYROXINE SODIUM 50 MCG: 0.05 TABLET ORAL at 06:27

## 2024-04-04 RX ADMIN — DIPHENHYDRAMINE HYDROCHLORIDE 50 MG: 50 INJECTION, SOLUTION INTRAMUSCULAR; INTRAVENOUS at 00:25

## 2024-04-04 RX ADMIN — CYCLOBENZAPRINE 10 MG: 10 TABLET, FILM COATED ORAL at 13:33

## 2024-04-04 RX ADMIN — HALOPERIDOL LACTATE 5 MG: 5 INJECTION, SOLUTION INTRAMUSCULAR at 00:26

## 2024-04-04 RX ADMIN — CHLORPROMAZINE HYDROCHLORIDE 50 MG: 25 INJECTION INTRAMUSCULAR at 21:37

## 2024-04-04 RX ADMIN — SERTRALINE 100 MG: 100 TABLET, FILM COATED ORAL at 08:52

## 2024-04-04 RX ADMIN — DIPHENHYDRAMINE HYDROCHLORIDE 50 MG: 50 INJECTION, SOLUTION INTRAMUSCULAR; INTRAVENOUS at 10:50

## 2024-04-04 RX ADMIN — Medication 2000 UNITS: at 08:52

## 2024-04-04 RX ADMIN — LORAZEPAM 2 MG: 2 INJECTION INTRAMUSCULAR; INTRAVENOUS at 00:26

## 2024-04-04 RX ADMIN — DIPHENHYDRAMINE HYDROCHLORIDE 50 MG: 50 INJECTION, SOLUTION INTRAMUSCULAR; INTRAVENOUS at 18:15

## 2024-04-04 RX ADMIN — SIMETHICONE 80 MG: 80 TABLET, CHEWABLE ORAL at 21:12

## 2024-04-04 RX ADMIN — OLANZAPINE 10 MG: 10 TABLET, FILM COATED ORAL at 08:52

## 2024-04-04 RX ADMIN — HYDROXYZINE HYDROCHLORIDE 50 MG: 50 TABLET, FILM COATED ORAL at 21:12

## 2024-04-04 RX ADMIN — PANTOPRAZOLE SODIUM 40 MG: 40 TABLET, DELAYED RELEASE ORAL at 06:27

## 2024-04-04 RX ADMIN — HALOPERIDOL LACTATE 5 MG: 5 INJECTION, SOLUTION INTRAMUSCULAR at 10:50

## 2024-04-04 RX ADMIN — ZIPRASIDONE MESYLATE 20 MG: 20 INJECTION, POWDER, LYOPHILIZED, FOR SOLUTION INTRAMUSCULAR at 11:55

## 2024-04-04 RX ADMIN — HALOPERIDOL LACTATE 5 MG: 5 INJECTION, SOLUTION INTRAMUSCULAR at 18:15

## 2024-04-04 RX ADMIN — TRAZODONE HYDROCHLORIDE 50 MG: 50 TABLET ORAL at 21:12

## 2024-04-04 ASSESSMENT — PAIN SCALES - GENERAL: PAINLEVEL_OUTOF10: 0

## 2024-04-04 NOTE — BH NOTE
Post Restraint and Seclusion Patient Debriefing    Did debriefing occur? no    If No, why not?  [x] Patient refused  [] Patient is unavailable for debriefing due to:  [] Patient debriefing not completed due to clinical contraindication of:    What events led to the seclusion/restraint incident? Patient was yelling loudly, hitting fist on the wall, and banging her head against the floor.       Did being restrained or in seclusion help you regain control of your behavior? Patient refused      Did you feel safe while you were in restraint or seclusion:      [] Very Safe  [] Safe  [] Somewhat Safe  [] Not Safe     Did you have the chance to gain control of your behavior before you were secluded or restrained? Patient refused    If Yes, how:    [] Offered Medication  [] Talked with  [] Given Time Out      [] Offered alternatives to restraint/seclusion     During the restraint or seclusion process were you offered medicine to help you gain control? Patient refused      Were your physical and emotional needs met, and your privacy rights addressed while you were in restraints/seclusion? Patient refused      How can we assist you in remaining restraint or seclusion free in the future?      Is there anything else you would like to share regarding this restraint/seclusion episode?    Patient consented to family or significant other to participate in debriefing     Patient's guardian participated in debriefing (when applicable) no    Patient's guardian unable to participate in debriefing (when applicable)    Staff:  Were modifications to the treatment plan completed? yes,

## 2024-04-04 NOTE — BH NOTE
Pt is disruptive as evidence by hitting walls, kicking bed frame, throwing items around her room causing peers to awaken. Pt refusing to talk with staff, pt declines distractions or suggestions pt smiles when she hears peers yelling about getting woken up. PT given IM haldol 5mg ativan 2mg and benadryl 50mg with 3 staff present will continue to monitor.
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
## 2024-04-04 NOTE — BH NOTE
PO prn effective pt currently play dice with one to one staff member will continue to monitor. Pt denies any side effects.

## 2024-04-04 NOTE — BH NOTE
PRN ADMINISTRATION, PHYSICAL HOLD, INITIATION OF 4 POINT RESTRAINTS     Emergency PRN Medication Administration Note:      Patient is Agitated, Disruptive, Destructive, and Dangerous as evidence by yelling loudly, hitting her fists on the wall and banging her head against the wall. Staff attempted to find and relieve the distress by Talking to patient, Refocusing on new activity, Offering suggestions, Checking for undiagnosed pain, and Administer PRN medications. Patient is currently continuing to escalate. Medication Administered as prescribed: Haldol 5 mg IM and Benadryl 50 mg IM at 1815. Patient Tolerated medication administration.   Will continue to monitor, offer support, and reassess.     Patient placed in physical hold at 1830, lasting a total time of 2 minutes while patient was transported to the restraint room.     Four point restraints initiated and locked at 1833.

## 2024-04-04 NOTE — BH NOTE
Patient agitated and disruptive as evidenced by pounding on the walls with her fist, throwing items in her room, kicking her bed, and slamming bathroom door. Staff attempted to help redirect patient, offered new activities, time to talk, re-direction. Multiple staff members attempted to help patient, patient refused redirection, refused to speak to staff, was attempting self harm and became very gamey, smiled as if she was enjoying the attention and continued to try to self harm. Patient's wrist band that she was using to hurt herself was removed. patient given IM PRN Haldol 5mg, Ativan 2mg, and benadryl 50mg without any holds at 1256. Patient encouraged to rest in room. Staff stayed outside patient  while the room door was left open. Patient continued to pound on walls and kick her bed,  began digging in her arm and hand with her nails. Multiple staff members then  again attempted to redirect patient verbally. Patient was then deemed a danger to self and staff intervened per safety protocols to help patient. CIT team was called, patient was escorted to seclusion, where staff removed clothing for safety and helped patient into paper pants and gown for safety. While patient was in seclusion being appropriately monitored she again attempted self harm again. Patient was then escorted to restraints to maintain patient's safety.

## 2024-04-04 NOTE — BH NOTE
IM PRN ineffective pt continue to bang on wall and kick bed frame pt moved to multipurpose room so she is no longer awakening peers. Pt accepting of move pt currently sitting  in ante room with 1:1 staff

## 2024-04-04 NOTE — BH NOTE
Patient continued to bang her head on the wall because she cannot call her grandma. With much attempt to redirect patient, patient refused and continued to harm herself. Ordered obtained for 4 point restraints.

## 2024-04-04 NOTE — BH NOTE
BEHAVIORAL SERVICES: One - Hour In- Person Review  For Management of Violent or Self - Destructive Behavior    Seclusion/Restraint: restraint  4 points    Reason for Intervention: safety of patient and others, patient hitting fist on wall and banging head on the floor    Response to Intervention: Patient screaming, crying, pulling against restraints    Medical Record reviewed and discussed precipitating events/behaviors with RN initiating Intervention:  Yes    Patient Medical Status:  Vital Signs: unable to obtain  Respiratory Status: no acute distress noted  Circulatory Status: palpable  Skin Integrity: scattered bruising and superficial scratches to arms noted    Orientation: oriented to person, place, time/date, and situation    Mood/Affect: Anxious  Angry  Behavioral    Speech: spontaneous, rapid, loud, pressured, and crying    Thought Content: patient has developmental disability and is very childlike    Thought Processes: Goal-Directed    Rationale for continued use of intervention: Patient continues to be: patient continues to try to engage in self harm behaviors    Rationale for discontinuing intervention: Patient is able to: follow staff direction, absence of self harm behaviors    One Hour Review Evaluation Physician Notification: Dr Bosch

## 2024-04-04 NOTE — BH NOTE
Emergency PRN Medication Administration Note:      Patient is Agitated, Disruptive, and Destructive as evidence by Patient threatening to bang her head, wrap sheets around her neck, scratching at skin on her arms. Staff attempted to find and relieve the distress by Talking to patient, Refocusing on new activity, and Offering suggestions Patient is currently continuing to escalate. Medication Administered as prescribed: Geodon 20mg IM. Patient Tolerated medication administration.   Will continue to monitor, offer support, and reassess.

## 2024-04-04 NOTE — BH NOTE
Discontinuation of restraints    Restraints removed at this time for a total time of 69 minutes. Patient able to verbalize and demonstrate criteria for discontinuation and reports she will stay free of self harm.

## 2024-04-04 NOTE — BH NOTE
Contacted Guardian. Updated on patient status. Guardian informed not to give into patient, patient has a history of these behaviors in order for others to give into what she wants.

## 2024-04-04 NOTE — BH NOTE
INITIATION OF  4 POINT RESTRAINTS    Patient was agitated disruptive and destructive as evidence by yelling loudly, hitting her fists on the wall, and banging her head against the floor. Staff tried to relieve the stress by redirecting patient, finding a new activity to focus on, and talking to patient. Patient unable to regain behavioral control. Four point restraints initiated and locked.

## 2024-04-04 NOTE — BH NOTE
Guardian contacted and updated regarding patient behavior. Jennifer informed writer that patient is making several and constant phone calls to her and her daughter all day long and up until 10pm at night. Guardian stated that when patient is at [facility] where patient resides that they have her on a schedule with the phone where her phone privileges are on Tuesdays and Sundays from 2pm-8pm. Guardian also stated that patient will become agitated if no one is able to answer her calls and she would like if we could follow the same phone privileges asa [facility].

## 2024-04-04 NOTE — BH NOTE
Emergency Medication Follow-Up Note:    PRN medication of Ativan 2 mg IM, Haldol 5mg IM, Benadryl 50mg IM was ineffective as patient is currently exhibiting Agitated, Disruptive, and Destructive  behavior as evidence by Patient threatening to bang her head,staff intervened in attempt to use sheet to self harm, scratching at skin on her arms. Staff has attempted to find and relieve the distress by Talking to patient, Refocusing on new activity, and Offering suggestions , with no success. Staff will offer additional medication, and provide quiet time.

## 2024-04-05 PROBLEM — F25.1 SCHIZOAFFECTIVE DISORDER, DEPRESSIVE TYPE (HCC): Status: ACTIVE | Noted: 2024-04-05

## 2024-04-05 PROCEDURE — APPSS30 APP SPLIT SHARED TIME 16-30 MINUTES

## 2024-04-05 PROCEDURE — 6370000000 HC RX 637 (ALT 250 FOR IP): Performed by: PSYCHIATRY & NEUROLOGY

## 2024-04-05 PROCEDURE — 6360000002 HC RX W HCPCS: Performed by: PSYCHIATRY & NEUROLOGY

## 2024-04-05 PROCEDURE — 6370000000 HC RX 637 (ALT 250 FOR IP): Performed by: NURSE PRACTITIONER

## 2024-04-05 PROCEDURE — 99232 SBSQ HOSP IP/OBS MODERATE 35: CPT | Performed by: PSYCHIATRY & NEUROLOGY

## 2024-04-05 PROCEDURE — 1240000000 HC EMOTIONAL WELLNESS R&B

## 2024-04-05 RX ADMIN — OLANZAPINE 15 MG: 15 TABLET, FILM COATED ORAL at 21:23

## 2024-04-05 RX ADMIN — SERTRALINE 100 MG: 100 TABLET, FILM COATED ORAL at 08:52

## 2024-04-05 RX ADMIN — SIMETHICONE 80 MG: 80 TABLET, CHEWABLE ORAL at 08:52

## 2024-04-05 RX ADMIN — LEVOTHYROXINE SODIUM 50 MCG: 0.05 TABLET ORAL at 08:52

## 2024-04-05 RX ADMIN — ACETAMINOPHEN 650 MG: 325 TABLET ORAL at 08:52

## 2024-04-05 RX ADMIN — TRAZODONE HYDROCHLORIDE 50 MG: 50 TABLET ORAL at 21:23

## 2024-04-05 RX ADMIN — DIVALPROEX SODIUM 750 MG: 500 TABLET, EXTENDED RELEASE ORAL at 21:23

## 2024-04-05 RX ADMIN — SIMETHICONE 80 MG: 80 TABLET, CHEWABLE ORAL at 21:29

## 2024-04-05 RX ADMIN — HALOPERIDOL LACTATE 5 MG: 5 INJECTION, SOLUTION INTRAMUSCULAR at 11:40

## 2024-04-05 RX ADMIN — PANTOPRAZOLE SODIUM 40 MG: 40 TABLET, DELAYED RELEASE ORAL at 08:52

## 2024-04-05 RX ADMIN — HYDROXYZINE HYDROCHLORIDE 50 MG: 50 TABLET, FILM COATED ORAL at 19:03

## 2024-04-05 RX ADMIN — CYCLOBENZAPRINE 10 MG: 10 TABLET, FILM COATED ORAL at 21:24

## 2024-04-05 RX ADMIN — ALUMINUM HYDROXIDE, MAGNESIUM HYDROXIDE, AND SIMETHICONE 30 ML: 1200; 120; 1200 SUSPENSION ORAL at 18:40

## 2024-04-05 RX ADMIN — Medication 2000 UNITS: at 08:52

## 2024-04-05 RX ADMIN — DIVALPROEX SODIUM 500 MG: 500 TABLET, EXTENDED RELEASE ORAL at 08:51

## 2024-04-05 RX ADMIN — DIPHENHYDRAMINE HYDROCHLORIDE 50 MG: 50 INJECTION, SOLUTION INTRAMUSCULAR; INTRAVENOUS at 11:40

## 2024-04-05 RX ADMIN — OLANZAPINE 10 MG: 10 TABLET, FILM COATED ORAL at 08:52

## 2024-04-05 ASSESSMENT — PAIN SCALES - GENERAL
PAINLEVEL_OUTOF10: 0
PAINLEVEL_OUTOF10: 3
PAINLEVEL_OUTOF10: 0

## 2024-04-05 ASSESSMENT — LIFESTYLE VARIABLES
HOW MANY STANDARD DRINKS CONTAINING ALCOHOL DO YOU HAVE ON A TYPICAL DAY: PATIENT DOES NOT DRINK
HOW OFTEN DO YOU HAVE A DRINK CONTAINING ALCOHOL: NEVER

## 2024-04-05 ASSESSMENT — PAIN - FUNCTIONAL ASSESSMENT: PAIN_FUNCTIONAL_ASSESSMENT: ACTIVITIES ARE NOT PREVENTED

## 2024-04-05 NOTE — BH NOTE
DISCONTINUATION S/R    Patient released from restraints. Patient is calm, cooperative and resting with eyes open. No injuries or distress noted. 1:1 continued.
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
## 2024-04-05 NOTE — CARE COORDINATION
Social Work spoke with Sandy Butler regarding patient's discharge plans and transportation. Sandy stated that [facility] is now unable to provide transportation and patient will need either ambulet or ambulance to:    [address] [place] [facility], [address]

## 2024-04-05 NOTE — BH NOTE
PRN NOTE    PRN effective. Patient no longer screaming, patient resting in bed with eyes open, compliant with staff. Patient drinking water.

## 2024-04-05 NOTE — BH NOTE
Post Restraint and Seclusion Patient Debriefing    Did debriefing occur? no    If No, why not?  [x] Patient refused  [] Patient is unavailable for debriefing due to:  [] Patient debriefing not completed due to clinical contraindication of:    What events led to the seclusion/restraint incident?      Did being restrained or in seclusion help you regain control of your behavior? refused      Did you feel safe while you were in restraint or seclusion:      [] Very Safe  [] Safe  [] Somewhat Safe  [] Not Safe     Did you have the chance to gain control of your behavior before you were secluded or restrained? refused  If Yes, how:    [] Offered Medication  [] Talked with  [] Given Time Out      [] Offered alternatives to restraint/seclusion     During the restraint or seclusion process were you offered medicine to help you gain control? refused      Were your physical and emotional needs met, and your privacy rights addressed while you were in restraints/seclusion? refused      How can we assist you in remaining restraint or seclusion free in the future?      Is there anything else you would like to share regarding this restraint/seclusion episode?    Patient consented to family or significant other to participate in debriefing has guardian     Patient's guardian participated in debriefing (when applicable) no    Patient's guardian unable to participate in debriefing (when applicable) not available     Staff:  Were modifications to the treatment plan completed? No-already modified

## 2024-04-05 NOTE — BH NOTE
BEHAVIORAL SERVICES: One - Hour In- Person Review  For Management of Violent or Self - Destructive Behavior    Seclusion/Restraint:  Physical hold and 4 point restraints    Reason for Intervention: Patient walked to day area with 1:1, grabbed at chair and attempted to throw chair. Refused verbal redirection.    Response to Intervention:  Patient was sitting up and banging wrist on bed.  Patient laid down and had some agitation and refused to stop behaviors.    Medical Record reviewed and discussed precipitating events/behaviors with RN initiating Intervention:  Yes    Patient Medical Status:  Vital Signs: Unable to assess due patient behavior   Respiratory Status: Even and non labored  Circulatory Status: Unable to assess due to patient behavior  Skin Integrity: Unable to assess due to patient behavior    Orientation: oriented to person, place, time/date, and situation    Mood/Affect: labile    Speech: Mumbled    Thought Content: Unable to assess due to patient behaviors.    Thought Processes: Blocking    Rationale for continued use of intervention: Patient refused verbal redirection and continued to exhibit gross motor agitation    Rationale for discontinuing intervention: Patient is able to: regain behavioral control.    One Hour Review Evaluation Physician Notification:  completed

## 2024-04-05 NOTE — BH NOTE
DISCONTINUED S/R      Patient released from physical hold. No injuries or distress noted.   beba all pertinent systems normal

## 2024-04-05 NOTE — BH NOTE
BEHAVIORAL SERVICES: One - Hour In- Person Review  For Management of Violent or Self - Destructive Behavior    Seclusion/Restraint: restraint and physical hold of 2 minutes    Reason for Intervention: patient engaging in self harm behaviors, banging head, screaming, disruptive    Response to Intervention: patient remains screaming and attempting to harm self, stops to engage in conversation with advocate but when she doesn't get the answer she wants continues to act out    Medical Record reviewed and discussed precipitating events/behaviors with RN initiating Intervention:  Yes    Patient Medical Status:  Vital Signs: unable to obtain  Respiratory Status: no acute distress  Circulatory Status: palpable  Skin Integrity: bruises and superficial scratches noted too arms    Orientation: oriented to person, place, and time/date    Mood/Affect: Anxious  Angry    Speech: spontaneous, rapid, loud, pressured, and crying    Thought Content: no delusions and patient is developmentally delayed and childlike    Thought Processes: Goal-Directed    Rationale for continued use of intervention: Patient continues to be:safety of patient and others    Rationale for discontinuing intervention: Patient is able to: ability to follow staff direction and absence of self harm behaviors    One Hour Review Evaluation Physician Notification:Dr Bosch

## 2024-04-05 NOTE — BH NOTE
Post Restraint and Seclusion Patient Debriefing    Did debriefing occur? yes    If No, why not?  [] Patient refused  [] Patient is unavailable for debriefing due to:  [] Patient debriefing not completed due to clinical contraindication of:    What events led to the seclusion/restraint incident?      Did being restrained or in seclusion help you regain control of your behavior? yes,       Did you feel safe while you were in restraint or seclusion:      [] Very Safe  [x] Safe  [] Somewhat Safe  [] Not Safe     Did you have the chance to gain control of your behavior before you were secluded or restrained? yes,     If Yes, how:    [] Offered Medication  [x] Talked with  [] Given Time Out      [x] Offered alternatives to restraint/seclusion     During the restraint or seclusion process were you offered medicine to help you gain control? yes,       Were your physical and emotional needs met, and your privacy rights addressed while you were in restraints/seclusion? yes,       How can we assist you in remaining restraint or seclusion free in the future? Talk with me, listen to music      Is there anything else you would like to share regarding this restraint/seclusion episode? No     Patient consented to family or significant other to participate in debriefing no    Patient's guardian participated in debriefing (when applicable) no    Patient's guardian unable to participate in debriefing (when applicable) yes,     Staff:  Were modifications to the treatment plan completed? yes,

## 2024-04-05 NOTE — BH NOTE
Emergency Medication Follow-Up Note:    PRN medication of Haldol 5mg IM and Bendaryl 50mg IM in R deltoid was effective as evidence by Patient regain behavioral control. Patient social with 1:1 staff at side. Patient denies medication side effects. Will continue to monitor and provide support as needed.

## 2024-04-05 NOTE — BH NOTE
DISCONTINUATION OF RESTRAINT    Patient released from restraints at 1950. At the time of release the patient was calm, cooperative, behavior controlled. Patient was in restraints from 0399-7331 total 77 minutes. Patient requested a shower at the time of release.
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
## 2024-04-05 NOTE — BH NOTE
BEHAVIORAL SERVICES: One - Hour In- Person Review  For Management of Violent or Self - Destructive Behavior    Seclusion/Restraint:  restraints    Reason for Intervention: Patient not redirectable continues to escalate behaviors. Trying to throw chair and harm peer and staff.     Response to Intervention:  NOt following staff directions     Medical Record reviewed and discussed precipitating events/behaviors with RN initiating Intervention:  Yes    Patient Medical Status:  Vital Signs: refused  Respiratory Status: refused  Circulatory Status: refused  Skin Integrity:refused    Orientation: oriented to person, place, time/date, and situation    Mood/Affect: labile    Speech: Loud and Pressured    Thought Content: paranoid    Thought Processes: Racing    Rationale for continued use of intervention: not able to follow staff directions     Rationale for discontinuing intervention: Patient is able to: able to follow staff directions     One Hour Review Evaluation Physician Notification:  yes

## 2024-04-05 NOTE — BH NOTE
Emergency PRN Medication Administration Note:  Initiation of Restraints Note:  Initiation and Discontinuation of Physical Hold:      Patient and 1:1 walked out to day area, when another patient was asked to be Ms. Lyman friend and declined, patient became Agitated, Disruptive, and Dangerous as evidence by patient walked to day area with 1:1, grabbed at chair and attempted to throw chair. Staff attempted to find and relieve the distress by Talking to patient, Refocusing on new activity, Offering suggestions, and Administer PRN medications Patient is currently continuing to escalate. At this time staff initiated a physical hold at 1139 lasting approximately 1 full minute to transfer patient to restraint room.  Patient at this time, hit arm multiple times against the restraint bed frame. Patient continued to self harm, restraints initiated at 1140. Medication Administered as prescribed: Haldol 5mg and Benadryl 50mg IM in Right Deltoid Patient Tolerated medication administration. Advocate on unit. Dr. Mckeon notified via [facility] messaging @ 1147. RN notified to reach Dr. Bosch, [facility] does not reach out to Dr. Bosch, On call RAUL Rodriguez notified at 1155. RAUL Bedoya notified at 1158. RAUL Bedoya provides orders @ 1159.   Will continue to monitor, offer support, and reassess.

## 2024-04-05 NOTE — BH NOTE
INITIATING S/R      Patient placed in hard 4 point restraint for danger to peers. Patient agitated, verbally aggressive and combative. No injuries or distress noted.

## 2024-04-05 NOTE — BH NOTE
Discontinuation of Restraint     Restraints discontinued at 1315, lasted 95 minutes. Patient verbalized understanding. Patient rested in bed, provided lunch tray, and assisted into bathroom. Patient social with 1:1 staff at side. RAUL Bedoya notified of discontinuation of restraints.

## 2024-04-05 NOTE — BH NOTE
Post Restraint and Seclusion Patient Debriefing    Did debriefing occur? yes    If No, why not?  [] Patient refused  [] Patient is unavailable for debriefing due to:  [] Patient debriefing not completed due to clinical contraindication of:    What events led to the seclusion/restraint incident?      Did being restrained or in seclusion help you regain control of your behavior? yes      Did you feel safe while you were in restraint or seclusion:      [] Very Safe  [x] Safe  [] Somewhat Safe  [] Not Safe     Did you have the chance to gain control of your behavior before you were secluded or restrained? yes,     If Yes, how:    [x] Offered Medication  [x] Talked with  [] Given Time Out      [x] Offered alternatives to restraint/seclusion     During the restraint or seclusion process were you offered medicine to help you gain control? no      Were your physical and emotional needs met, and your privacy rights addressed while you were in restraints/seclusion? yes      How can we assist you in remaining restraint or seclusion free in the future? Patient did not answer      Is there anything else you would like to share regarding this restraint/seclusion episode?    Patient consented to family or significant other to participate in debriefing yes    Patient's guardian participated in debriefing (when applicable) no, guardian notified of event and made aware    Patient's guardian unable to participate in debriefing (when applicable) yes,     Staff:  Were modifications to the treatment plan completed? yes,

## 2024-04-06 LAB
BILIRUB UR QL STRIP: NEGATIVE
CLARITY UR: CLEAR
COLOR UR: YELLOW
COMMENT: NORMAL
GLUCOSE UR STRIP-MCNC: NEGATIVE MG/DL
HGB UR QL STRIP.AUTO: NEGATIVE
KETONES UR STRIP-MCNC: NEGATIVE MG/DL
LEUKOCYTE ESTERASE UR QL STRIP: NEGATIVE
NITRITE UR QL STRIP: NEGATIVE
PH UR STRIP: 6.5 [PH] (ref 5–8)
PROT UR STRIP-MCNC: NEGATIVE MG/DL
SP GR UR STRIP: 1 (ref 1–1.03)
UROBILINOGEN UR STRIP-ACNC: NORMAL EU/DL (ref 0–1)

## 2024-04-06 PROCEDURE — 6370000000 HC RX 637 (ALT 250 FOR IP): Performed by: PSYCHIATRY & NEUROLOGY

## 2024-04-06 PROCEDURE — 6370000000 HC RX 637 (ALT 250 FOR IP): Performed by: NURSE PRACTITIONER

## 2024-04-06 PROCEDURE — 81003 URINALYSIS AUTO W/O SCOPE: CPT

## 2024-04-06 PROCEDURE — 1240000000 HC EMOTIONAL WELLNESS R&B

## 2024-04-06 PROCEDURE — 99232 SBSQ HOSP IP/OBS MODERATE 35: CPT | Performed by: PSYCHIATRY & NEUROLOGY

## 2024-04-06 PROCEDURE — 99232 SBSQ HOSP IP/OBS MODERATE 35: CPT | Performed by: INTERNAL MEDICINE

## 2024-04-06 RX ORDER — CEPHALEXIN 250 MG/1
250 CAPSULE ORAL EVERY 6 HOURS SCHEDULED
Status: DISCONTINUED | OUTPATIENT
Start: 2024-04-06 | End: 2024-04-06

## 2024-04-06 RX ADMIN — OLANZAPINE 15 MG: 15 TABLET, FILM COATED ORAL at 20:25

## 2024-04-06 RX ADMIN — DIVALPROEX SODIUM 750 MG: 500 TABLET, EXTENDED RELEASE ORAL at 08:57

## 2024-04-06 RX ADMIN — OLANZAPINE 10 MG: 10 TABLET, FILM COATED ORAL at 08:57

## 2024-04-06 RX ADMIN — Medication 2000 UNITS: at 08:58

## 2024-04-06 RX ADMIN — LEVOTHYROXINE SODIUM 50 MCG: 0.05 TABLET ORAL at 06:14

## 2024-04-06 RX ADMIN — SERTRALINE 100 MG: 100 TABLET, FILM COATED ORAL at 08:57

## 2024-04-06 RX ADMIN — TRAZODONE HYDROCHLORIDE 50 MG: 50 TABLET ORAL at 20:25

## 2024-04-06 RX ADMIN — ALUMINUM HYDROXIDE, MAGNESIUM HYDROXIDE, AND SIMETHICONE 30 ML: 1200; 120; 1200 SUSPENSION ORAL at 14:31

## 2024-04-06 RX ADMIN — SIMETHICONE 80 MG: 80 TABLET, CHEWABLE ORAL at 08:58

## 2024-04-06 RX ADMIN — DIVALPROEX SODIUM 750 MG: 500 TABLET, EXTENDED RELEASE ORAL at 20:25

## 2024-04-06 RX ADMIN — HYDROXYZINE HYDROCHLORIDE 50 MG: 50 TABLET, FILM COATED ORAL at 03:04

## 2024-04-06 RX ADMIN — ACETAMINOPHEN 650 MG: 325 TABLET ORAL at 21:15

## 2024-04-06 RX ADMIN — ACETAMINOPHEN 650 MG: 325 TABLET ORAL at 05:38

## 2024-04-06 RX ADMIN — SIMETHICONE 80 MG: 80 TABLET, CHEWABLE ORAL at 20:25

## 2024-04-06 RX ADMIN — ACETAMINOPHEN 650 MG: 325 TABLET ORAL at 14:31

## 2024-04-06 RX ADMIN — HYDROXYZINE HYDROCHLORIDE 50 MG: 50 TABLET, FILM COATED ORAL at 18:16

## 2024-04-06 RX ADMIN — PANTOPRAZOLE SODIUM 40 MG: 40 TABLET, DELAYED RELEASE ORAL at 05:38

## 2024-04-06 ASSESSMENT — PAIN SCALES - GENERAL
PAINLEVEL_OUTOF10: 0
PAINLEVEL_OUTOF10: 3
PAINLEVEL_OUTOF10: 0
PAINLEVEL_OUTOF10: 3
PAINLEVEL_OUTOF10: 0

## 2024-04-06 ASSESSMENT — PAIN SCALES - WONG BAKER
WONGBAKER_NUMERICALRESPONSE: NO HURT
WONGBAKER_NUMERICALRESPONSE: NO HURT

## 2024-04-06 ASSESSMENT — PAIN DESCRIPTION - LOCATION
LOCATION: BACK
LOCATION: HAND

## 2024-04-06 ASSESSMENT — PAIN DESCRIPTION - ORIENTATION: ORIENTATION: RIGHT

## 2024-04-06 ASSESSMENT — PAIN DESCRIPTION - DESCRIPTORS: DESCRIPTORS: ACHING

## 2024-04-06 NOTE — BH NOTE
1:1 provider noted twitching to patient right eye. This writer assess patient vitals and neurological response. Pupils equal round and reactive to light and accommodation. Writer ask patient to squeeze writers hands, patient shakes her head no. Patient is able to keep arms elevated until instructed to put arms down. Patient is calm, not verbally responding.  Message sent to on call internal medicine.

## 2024-04-07 PROCEDURE — 99254 IP/OBS CNSLTJ NEW/EST MOD 60: CPT | Performed by: PSYCHIATRY & NEUROLOGY

## 2024-04-07 PROCEDURE — APPSS30 APP SPLIT SHARED TIME 16-30 MINUTES: Performed by: NURSE PRACTITIONER

## 2024-04-07 PROCEDURE — 36415 COLL VENOUS BLD VENIPUNCTURE: CPT

## 2024-04-07 PROCEDURE — 1240000000 HC EMOTIONAL WELLNESS R&B

## 2024-04-07 PROCEDURE — 6370000000 HC RX 637 (ALT 250 FOR IP): Performed by: PSYCHIATRY & NEUROLOGY

## 2024-04-07 PROCEDURE — 99232 SBSQ HOSP IP/OBS MODERATE 35: CPT | Performed by: PSYCHIATRY & NEUROLOGY

## 2024-04-07 PROCEDURE — 6370000000 HC RX 637 (ALT 250 FOR IP): Performed by: NURSE PRACTITIONER

## 2024-04-07 RX ADMIN — LEVOTHYROXINE SODIUM 50 MCG: 0.05 TABLET ORAL at 06:54

## 2024-04-07 RX ADMIN — OLANZAPINE 10 MG: 10 TABLET, FILM COATED ORAL at 08:40

## 2024-04-07 RX ADMIN — HYDROXYZINE HYDROCHLORIDE 50 MG: 50 TABLET, FILM COATED ORAL at 20:01

## 2024-04-07 RX ADMIN — SIMETHICONE 80 MG: 80 TABLET, CHEWABLE ORAL at 20:10

## 2024-04-07 RX ADMIN — ALUMINUM HYDROXIDE, MAGNESIUM HYDROXIDE, AND SIMETHICONE 30 ML: 1200; 120; 1200 SUSPENSION ORAL at 13:52

## 2024-04-07 RX ADMIN — Medication 2000 UNITS: at 08:40

## 2024-04-07 RX ADMIN — HYDROXYZINE HYDROCHLORIDE 50 MG: 50 TABLET, FILM COATED ORAL at 08:40

## 2024-04-07 RX ADMIN — POLYETHYLENE GLYCOL 3350 17 G: 17 POWDER, FOR SOLUTION ORAL at 15:21

## 2024-04-07 RX ADMIN — DIVALPROEX SODIUM 750 MG: 500 TABLET, EXTENDED RELEASE ORAL at 20:01

## 2024-04-07 RX ADMIN — TRAZODONE HYDROCHLORIDE 50 MG: 50 TABLET ORAL at 20:01

## 2024-04-07 RX ADMIN — ACETAMINOPHEN 650 MG: 325 TABLET ORAL at 14:55

## 2024-04-07 RX ADMIN — ACETAMINOPHEN 650 MG: 325 TABLET ORAL at 10:01

## 2024-04-07 RX ADMIN — SIMETHICONE 80 MG: 80 TABLET, CHEWABLE ORAL at 08:40

## 2024-04-07 RX ADMIN — OLANZAPINE 15 MG: 15 TABLET, FILM COATED ORAL at 20:00

## 2024-04-07 RX ADMIN — SERTRALINE 100 MG: 100 TABLET, FILM COATED ORAL at 08:40

## 2024-04-07 RX ADMIN — PANTOPRAZOLE SODIUM 40 MG: 40 TABLET, DELAYED RELEASE ORAL at 06:54

## 2024-04-07 RX ADMIN — DIVALPROEX SODIUM 750 MG: 500 TABLET, EXTENDED RELEASE ORAL at 08:40

## 2024-04-07 ASSESSMENT — PAIN DESCRIPTION - LOCATION
LOCATION: HEAD
LOCATION: CHEST

## 2024-04-07 ASSESSMENT — PAIN SCALES - GENERAL
PAINLEVEL_OUTOF10: 4
PAINLEVEL_OUTOF10: 4
PAINLEVEL_OUTOF10: 2
PAINLEVEL_OUTOF10: 2
PAINLEVEL_OUTOF10: 3
PAINLEVEL_OUTOF10: 0
PAINLEVEL_OUTOF10: 0

## 2024-04-07 ASSESSMENT — PAIN - FUNCTIONAL ASSESSMENT
PAIN_FUNCTIONAL_ASSESSMENT: ACTIVITIES ARE NOT PREVENTED

## 2024-04-07 ASSESSMENT — PAIN SCALES - WONG BAKER
WONGBAKER_NUMERICALRESPONSE: NO HURT
WONGBAKER_NUMERICALRESPONSE: NO HURT

## 2024-04-07 ASSESSMENT — PAIN DESCRIPTION - DESCRIPTORS: DESCRIPTORS: BURNING

## 2024-04-07 ASSESSMENT — PAIN DESCRIPTION - ORIENTATION: ORIENTATION: MID

## 2024-04-07 NOTE — CONSULTS
[facility] Neurology   IN-PATIENT SERVICE      NEUROLOGY CONSULT  NOTE            Date:   4/7/2024  Patient name:  Esmer Monzon  Date of admission:  3/28/2024  YOB: 1995      Chief Complaint:     Chief Complaint   Patient presents with    Mental Health Problem       Reason for Consult:      Seizure like activity     History of Present Illness:     The patient is a 28 y.o. female who presents with Mental Health Problem  . The patient was seen and examined and the chart was reviewed.     This is a 28 year old female who presented to [facility] for suicidal ideations.     Has been see by Neurology at [facility] on 1/17/22 for staring episodes. She has a history of developmental delay, anxiety, depression, schizoaffective disorder. She was on oxcarbamazepine 300mg in the am and 600mg in the PM.     Multiple nursing notes reviewed as patient is combative, aggressive.     She is now on VPA 750mg BID. Neurology asked to consult due to facial twitching/ eye twitching.     Past Medical History:     Past Medical History:   Diagnosis Date    Abnormal liver function     Fatty liver     Gastroesophageal reflux disease without esophagitis     Obesity (BMI 30.0-34.9)     Other specified hypothyroidism     Psychiatric problem     Schizoaffective disorder, depressive type (HCC)         Past Surgical History:     History reviewed. No pertinent surgical history.     Medications Prior to Admission:     Prior to Admission medications    Medication Sig Start Date End Date Taking? Authorizing Provider   acetaminophen (TYLENOL) 325 MG tablet Take 2 tablets by mouth every 6 hours as needed for Pain   Yes Barbara Shields MD   bisacodyl (DULCOLAX) 5 MG EC tablet Take 1 tablet by mouth daily as needed for Constipation   Yes Barbara Shields MD   Cholecalciferol (VITAMIN D) 50 MCG (2000 UT) CAPS capsule Take 1 capsule by mouth every morning   Yes Barbara Shields MD   cyclobenzaprine (FLEXERIL) 10 MG tablet Take 1 tablet

## 2024-04-07 NOTE — BH NOTE
Writer spoke with patient guardian regarding MRI with and without contrast as well as EEG. Guardian gives verbal permission to proceed with testing.

## 2024-04-08 ENCOUNTER — APPOINTMENT (OUTPATIENT)
Dept: MRI IMAGING | Age: 29
DRG: 750 | End: 2024-04-08
Payer: MEDICAID

## 2024-04-08 PROCEDURE — 90833 PSYTX W PT W E/M 30 MIN: CPT | Performed by: PSYCHIATRY & NEUROLOGY

## 2024-04-08 PROCEDURE — 6370000000 HC RX 637 (ALT 250 FOR IP): Performed by: PSYCHIATRY & NEUROLOGY

## 2024-04-08 PROCEDURE — APPSS30 APP SPLIT SHARED TIME 16-30 MINUTES

## 2024-04-08 PROCEDURE — 6370000000 HC RX 637 (ALT 250 FOR IP): Performed by: NURSE PRACTITIONER

## 2024-04-08 PROCEDURE — 70551 MRI BRAIN STEM W/O DYE: CPT

## 2024-04-08 PROCEDURE — 1240000000 HC EMOTIONAL WELLNESS R&B

## 2024-04-08 PROCEDURE — 99232 SBSQ HOSP IP/OBS MODERATE 35: CPT | Performed by: PSYCHIATRY & NEUROLOGY

## 2024-04-08 RX ORDER — SODIUM CHLORIDE 0.9 % (FLUSH) 0.9 %
10 SYRINGE (ML) INJECTION PRN
Status: DISCONTINUED | OUTPATIENT
Start: 2024-04-08 | End: 2024-04-10 | Stop reason: HOSPADM

## 2024-04-08 RX ADMIN — OLANZAPINE 10 MG: 10 TABLET, FILM COATED ORAL at 08:48

## 2024-04-08 RX ADMIN — HYDROXYZINE HYDROCHLORIDE 50 MG: 50 TABLET, FILM COATED ORAL at 08:47

## 2024-04-08 RX ADMIN — DIVALPROEX SODIUM 750 MG: 500 TABLET, EXTENDED RELEASE ORAL at 08:47

## 2024-04-08 RX ADMIN — ACETAMINOPHEN 650 MG: 325 TABLET ORAL at 11:26

## 2024-04-08 RX ADMIN — OLANZAPINE 15 MG: 15 TABLET, FILM COATED ORAL at 21:14

## 2024-04-08 RX ADMIN — SERTRALINE 100 MG: 100 TABLET, FILM COATED ORAL at 08:47

## 2024-04-08 RX ADMIN — ACETAMINOPHEN 650 MG: 325 TABLET ORAL at 21:48

## 2024-04-08 RX ADMIN — TRAZODONE HYDROCHLORIDE 50 MG: 50 TABLET ORAL at 21:14

## 2024-04-08 RX ADMIN — LEVOTHYROXINE SODIUM 50 MCG: 0.05 TABLET ORAL at 06:50

## 2024-04-08 RX ADMIN — ACETAMINOPHEN 650 MG: 325 TABLET ORAL at 17:58

## 2024-04-08 RX ADMIN — SIMETHICONE 80 MG: 80 TABLET, CHEWABLE ORAL at 08:48

## 2024-04-08 RX ADMIN — SIMETHICONE 80 MG: 80 TABLET, CHEWABLE ORAL at 21:13

## 2024-04-08 RX ADMIN — DIVALPROEX SODIUM 750 MG: 500 TABLET, EXTENDED RELEASE ORAL at 21:13

## 2024-04-08 RX ADMIN — PANTOPRAZOLE SODIUM 40 MG: 40 TABLET, DELAYED RELEASE ORAL at 06:50

## 2024-04-08 RX ADMIN — HYDROXYZINE HYDROCHLORIDE 50 MG: 50 TABLET, FILM COATED ORAL at 21:13

## 2024-04-08 RX ADMIN — Medication 2000 UNITS: at 08:48

## 2024-04-08 RX ADMIN — CYCLOBENZAPRINE 10 MG: 10 TABLET, FILM COATED ORAL at 21:13

## 2024-04-08 ASSESSMENT — PAIN SCALES - GENERAL
PAINLEVEL_OUTOF10: 5
PAINLEVEL_OUTOF10: 0
PAINLEVEL_OUTOF10: 5

## 2024-04-08 ASSESSMENT — PAIN DESCRIPTION - LOCATION
LOCATION: ABDOMEN
LOCATION: ABDOMEN

## 2024-04-08 ASSESSMENT — PAIN DESCRIPTION - DESCRIPTORS
DESCRIPTORS: ACHING;SHARP
DESCRIPTORS: CRAMPING

## 2024-04-08 NOTE — BH NOTE
Patient states \"it bothers me\" to 1:1 staff. Writer asks what is bothering her, after awhile she states \"two things\", but does not tell writer.     Her right eye started twitching and she started crying very hard, never reveling what she was initially saying to writer.   Patient offered 1:1 time, coping skills, and distraction.     Other staff mentioned that her right started twitching/ she started crying at this time a few days ago as well.

## 2024-04-08 NOTE — CARE COORDINATION
Social work attempted to return call to yoni Luz at [phone]. Social work left message asking for return call.

## 2024-04-08 NOTE — BH NOTE
Patient's legal guardian (Jennifer Rock)  contacted to complete MRI screening questionnaire. Guardian completed screening with writer via phone.

## 2024-04-08 NOTE — BH NOTE
Patient transported to MRI via wheelchair at 1315.  Patient accompanied by transport and I staff. Patient cooperative with transport.

## 2024-04-08 NOTE — BH NOTE
Patient returned to unit via wheel chair at 1355.  Patient escorted via transportation and [facility] staff

## 2024-04-08 NOTE — BH NOTE
Writer spoke with EEG department. They are unable to complete EEG today and will call to schedule it tomorrow morning.

## 2024-04-09 ENCOUNTER — APPOINTMENT (OUTPATIENT)
Dept: NEUROLOGY | Age: 29
DRG: 750 | End: 2024-04-09
Payer: MEDICAID

## 2024-04-09 PROCEDURE — 6370000000 HC RX 637 (ALT 250 FOR IP): Performed by: PSYCHIATRY & NEUROLOGY

## 2024-04-09 PROCEDURE — 99232 SBSQ HOSP IP/OBS MODERATE 35: CPT | Performed by: PSYCHIATRY & NEUROLOGY

## 2024-04-09 PROCEDURE — 6370000000 HC RX 637 (ALT 250 FOR IP): Performed by: NURSE PRACTITIONER

## 2024-04-09 PROCEDURE — 95819 EEG AWAKE AND ASLEEP: CPT | Performed by: PSYCHIATRY & NEUROLOGY

## 2024-04-09 PROCEDURE — 1240000000 HC EMOTIONAL WELLNESS R&B

## 2024-04-09 PROCEDURE — 90833 PSYTX W PT W E/M 30 MIN: CPT | Performed by: PSYCHIATRY & NEUROLOGY

## 2024-04-09 PROCEDURE — 95819 EEG AWAKE AND ASLEEP: CPT

## 2024-04-09 RX ORDER — CYCLOBENZAPRINE HCL 10 MG
10 TABLET ORAL 3 TIMES DAILY PRN
Qty: 30 TABLET | Refills: 0 | Status: CANCELLED | OUTPATIENT
Start: 2024-04-09

## 2024-04-09 RX ORDER — CHOLECALCIFEROL (VITAMIN D3) 50 MCG
2000 TABLET ORAL EVERY MORNING
Qty: 30 TABLET | Refills: 0 | Status: CANCELLED | OUTPATIENT
Start: 2024-04-10

## 2024-04-09 RX ADMIN — OLANZAPINE 15 MG: 15 TABLET, FILM COATED ORAL at 20:55

## 2024-04-09 RX ADMIN — LEVOTHYROXINE SODIUM 50 MCG: 0.05 TABLET ORAL at 06:48

## 2024-04-09 RX ADMIN — DIVALPROEX SODIUM 750 MG: 500 TABLET, EXTENDED RELEASE ORAL at 20:54

## 2024-04-09 RX ADMIN — DIVALPROEX SODIUM 750 MG: 500 TABLET, EXTENDED RELEASE ORAL at 09:18

## 2024-04-09 RX ADMIN — ALUMINUM HYDROXIDE, MAGNESIUM HYDROXIDE, AND SIMETHICONE 30 ML: 1200; 120; 1200 SUSPENSION ORAL at 17:27

## 2024-04-09 RX ADMIN — Medication 2000 UNITS: at 09:19

## 2024-04-09 RX ADMIN — HYDROXYZINE HYDROCHLORIDE 50 MG: 50 TABLET, FILM COATED ORAL at 09:19

## 2024-04-09 RX ADMIN — HYDROXYZINE HYDROCHLORIDE 50 MG: 50 TABLET, FILM COATED ORAL at 20:55

## 2024-04-09 RX ADMIN — CYCLOBENZAPRINE 10 MG: 10 TABLET, FILM COATED ORAL at 20:55

## 2024-04-09 RX ADMIN — SERTRALINE 100 MG: 100 TABLET, FILM COATED ORAL at 09:19

## 2024-04-09 RX ADMIN — CYCLOBENZAPRINE 10 MG: 10 TABLET, FILM COATED ORAL at 17:27

## 2024-04-09 RX ADMIN — SIMETHICONE 80 MG: 80 TABLET, CHEWABLE ORAL at 09:19

## 2024-04-09 RX ADMIN — PANTOPRAZOLE SODIUM 40 MG: 40 TABLET, DELAYED RELEASE ORAL at 06:48

## 2024-04-09 RX ADMIN — SIMETHICONE 80 MG: 80 TABLET, CHEWABLE ORAL at 20:55

## 2024-04-09 RX ADMIN — TRAZODONE HYDROCHLORIDE 50 MG: 50 TABLET ORAL at 20:55

## 2024-04-09 RX ADMIN — OLANZAPINE 10 MG: 10 TABLET, FILM COATED ORAL at 09:19

## 2024-04-09 ASSESSMENT — PAIN DESCRIPTION - DESCRIPTORS: DESCRIPTORS: CRAMPING

## 2024-04-09 ASSESSMENT — PAIN SCALES - GENERAL: PAINLEVEL_OUTOF10: 0

## 2024-04-09 ASSESSMENT — PAIN DESCRIPTION - LOCATION: LOCATION: ABDOMEN

## 2024-04-09 NOTE — CARE COORDINATION
[facility] med liaison will bring in patients medications and clothes for his discharge on Thursday.

## 2024-04-09 NOTE — GROUP NOTE
Group Therapy Note    Date: 3/28/2024    Group Start Time: 1000  Group End Time: 1013  Group Topic: Psychoeducation    [facility]    Juany Garcia MSW        Group Therapy Note    Attendees: 0/2       Patient was offered group therapy today but declined to participate despite encouragement from staff.  1:1 was offered.       Signature:  CARLOS Huynh    
                                                                      Group Therapy Note    Date: 3/28/2024    Group Start Time: 2005  Group End Time: 2020  Group Topic: Relaxation    [facility] Alisa Vu, RN      Group Therapy Note    Attendees: 3/5       Pt refused to attend Relaxation group this evening after encouragement from staff.  1:1 talk time offered as alternative to group session but pt declined. Will continue to encourage patient to attend unit group programming.        Signature:  Alisa Calle, RN         
                                                                      Group Therapy Note    Date: 3/28/2024    Group Start Time: 2035  Group End Time: 2050  Group Topic: Wrap-Up    Alisa Alberto, RN      Group Therapy Note    Attendees: 3/6      Patient's Goals:    1.  To be able to reflect on daily unit activities/experiences.  2.  To review accomplished daily goals and be encouraged to set new goals for the next day.     Notes:  Pt attended and participated in Wrap-Up/Goal Review group this evening.     Status After Intervention:  Improved     Participation Level: Minimal     Participation Quality: Appropriate      Speech:  hesitant     Thought Process/Content: Poverty of content     Affective Functioning: Congruent     Mood: anxious     Level of consciousness:  Alert, Oriented x3      Response to Learning: Progressing to goal     Endings: None Reported     Modes of Intervention: Education, Support and Socialization     Discipline Responsible: Registered Nurse        Signature:  Alisa Calle, RN  
                                                                      Group Therapy Note    Date: 3/29/2024    Group Start Time: 1100  Group End Time: 1145  Group Topic: Music Therapy    Patricio Nugent        Group Therapy Note    Attendees: 3/6      Patient's Goal: Patients shared music and analyzed lyrics for themes, and asked to offer advice based on themes with their song selections. Goals to increase motivation; Increase sense of community; Increase self-expression; Demonstrate positive use of time; Increase socialization     Notes: Patient attended and participated in group having positive interactions with peers and staff. Patient was pleasant and appropriately engaged, sharing music and engaging in brief conversation about her music though unable to identify advice. Singing along to music and periodically dancing in her chair     Status After Intervention: Improved     Participation Level: Active Listener and Interactive     Participation Quality: Appropriate, Attentive, and Sharing        Speech: normal        Thought Process/Content: Logical  Linear        Affective Functioning: Congruent        Mood: euthymic        Level of consciousness: Alert and Attentive        Response to Learning: Able to verbalize current knowledge/experience and Progressing to goal        Endings: None Reported     Modes of Intervention: Support, Socialization, Exploration, Activity, Media, and Reality-testing        Discipline Responsible: Psychoeducational Specialist        Signature: Patricio Bray  
                                                                      Group Therapy Note    Date: 3/30/2024    Group Start Time: 1000  Group End Time: 1030  Group Topic: Psychoeducation    [facility]    Juany Garcia MSW        Group Therapy Note    Attendees: 3/7       Patient was offered group therapy today but declined to participate despite encouragement from staff.  1:1 was offered.       Signature:  CARLOS Huynh    
                                                                      Group Therapy Note    Date: 3/30/2024    Group Start Time: 2110  Group End Time: 2140  Group Topic: Wrap-Up    Alisa Alberto, RN      Group Therapy Note    Attendees: 5/8    Patient's Goals:    1.  To be able to reflect on daily unit activities/experiences.  2.  To review accomplished daily goals and be encouraged to set new goals for the next day.  3.  To improve interpersonal interaction through socialization.        Notes:  Pt attended and participated in Wrap-Up/Goal Review group this evening.     Status After Intervention:  Unchanged     Participation Level: Minimal     Participation Quality: Appropriate, Sharing     Speech:  hesitant     Thought Process/Content: Linear     Affective Functioning: Congruent     Mood: anxious     Level of consciousness:  Alert, Oriented x3, Preoccupied     Response to Learning: Progressing to goal     Endings: None Reported     Modes of Intervention: Education, Support and Socialization     Discipline Responsible: Registered Nurse        Signature:  Alisa Calle RN  
                                                                      Group Therapy Note    Date: 3/31/2024    Group Start Time: 0900  Group End Time: 0930  Group Topic: Community Meeting    Zuly Hu LPN        Group Therapy Note    Attendees: 3/8       Patient's Goal:  Stay positive         Status After Intervention:  Improved    Participation Level: Minimal    Participation Quality: Appropriate, Attentive, and Sharing      Speech:  hesitant      Thought Process/Content: Logical      Affective Functioning: Flat      Mood: anxious      Level of consciousness:  Alert, Oriented x4, and Preoccupied      Response to Learning: Able to verbalize current knowledge/experience, Able to verbalize/acknowledge new learning, Able to retain information, Capable of insight, Able to change behavior, and Progressing to goal      Endings: Self-harm    Modes of Intervention: Education, Support, and Socialization      Discipline Responsible: Licensed Practical Nurse      Signature:  Zuly Cordoba LPN    
                                                                      Group Therapy Note    Date: 3/31/2024    Group Start Time: 1030  Group End Time: 1115  Group Topic: Psychoeducation    Yahir Regalado        Group Therapy Note    Attendees: 6/8       Patient's Goal:  PT will participate actively in group discussion using conversation cubes.   Notes:  Patient is making progress, AEB participating in group discussion, actively listening, and supporting other group members.    Status After Intervention:  Improved    Participation Level: Active Listener and Interactive    Participation Quality: Appropriate, Attentive, and Sharing      Speech:  normal      Thought Process/Content: Logical      Affective Functioning: Flat      Mood: euthymic      Level of consciousness:  Alert, Oriented x4, and Attentive      Response to Learning: Able to verbalize/acknowledge new learning and Progressing to goal      Endings: None Reported    Modes of Intervention: Education, Support, and Socialization      Discipline Responsible: /Counselor      Signature:  Yahir Nichole    
                                                                      Group Therapy Note    Date: 4/1/2024    Group Start Time: 0900  Group End Time: 0930  Group Topic: Community Meeting    Melinda Navarro LPN             Patient's Goal:  go to goals        Status After Intervention:  Unchanged    Participation Level: Active Listener    Participation Quality: Appropriate      Speech:  pressured      Thought Process/Content: Logical      Affective Functioning: Flat      Mood: anxious      Level of consciousness:  Alert      Response to Learning: Able to retain information      Endings: None Reported    Modes of Intervention: Support      Discipline Responsible: Licensed Practical Nurse      Signature:  Melinda Sanchez LPN    
                                                                      Group Therapy Note    Date: 4/1/2024    Group Start Time: 1015  Group End Time: 1045  Group Topic: Psychoeducation    Yahir Regalado        Group Therapy Note    Attendees: 5/11       Patient's Goal:  PT will participate actively in group discussion on discharge planning .    Notes:  :  Patient is making progress, AEB participating in group discussion, actively listening, and supporting other group members    Status After Intervention:  Unchanged    Participation Level: Active Listener and Interactive    Participation Quality: Appropriate, Attentive, and Sharing      Speech:  normal      Thought Process/Content: Logical      Affective Functioning: Flat      Mood: euthymic      Level of consciousness:  Alert, Oriented x4, and Attentive      Response to Learning: Able to verbalize/acknowledge new learning and Progressing to goal      Endings: None Reported    Modes of Intervention: Support, Socialization, and Exploration      Discipline Responsible: /Counselor      Signature:  Yahir Nichole    
                                                                      Group Therapy Note    Date: 4/2/2024    Group Start Time: 1000  Group End Time: 1028  Group Topic: Psychoeducation    [facility] Juany Gee MSW        Group Therapy Note    Attendees: 4/8       Patient's Goal:  To identify ways to feel better about self.    Notes:  Group discussion on ways to feel better about oneself such as positive affirmations, setting goals, being assertive, taking responsibility, respecting yourself, and helping others.     Status After Intervention:  Unchanged    Participation Level: Minimal    Participation Quality: Appropriate and Resistant      Speech:  hesitant      Thought Process/Content: Flight of ideas      Affective Functioning: Flat      Mood: euthymic      Level of consciousness:  Alert      Response to Learning: Progressing to goal      Endings: None Reported    Modes of Intervention: Education and Support      Discipline Responsible: /Counselor      Signature:  CARLOS Huynh    
                                                                      Group Therapy Note    Date: 4/2/2024    Group Start Time: 1430  Group End Time: 1515  Group Topic: Recreational    [facility] Sofy Ivory CTRS    Recreation Group Note        Date: April 2, 2024 Start Time: 2:30pm  End Time:  315pm      Number of Participants in Group & Unit Census:  5/8    Topic: recreation  group     Goal of Group:pt will demonstrate improved leisure awareness and improved coping skills       Comments:     Patient did not participate in Recreation group, despite staff encouragement and explanation of benefits.  Patient remain seclusive to self.  Q15 minute safety checks maintained for patient safety and will continue to encourage patient to attend unit programming.            Signature:  BABAR FERNANDEZ    
                                                                      Group Therapy Note    Date: 4/2/2024    Group Start Time: 1430  Group End Time: 1515  Group Topic: Recreational    [facility] Sofy Ivory CTRS    Recreation Group Note        Date: April 2, 2024 Start Time: 2:30pm  End Time:  315pm      Number of Participants in Group & Unit Census:  5/8    Topic: recreation  group     Goal of Group:pt will demonstrate improved leisure awareness and improved coping skills       Comments:     Patient did not participate in Recreation group, despite staff encouragement and explanation of benefits.  Patient remain seclusive to self.  Q15 minute safety checks maintained for patient safety and will continue to encourage patient to attend unit programming.            Signature:  BABAR FERNANDEZ    
                                                                      Group Therapy Note    Date: 4/3/2024    Group Start Time: 1100  Group End Time: 1150  Group Topic: Music Therapy    Patricio Cazares    Music Therapy Group Note        Date: 4/3/2024 Start Time: 1100  End Time: 1150      Number of Participants in Group & Unit Census:  4/9    Topic: Patient's Goal:  Patients worked together to create a play list of relaxing music. Following conclusion of relaxing play list, patients able to request other music for leisurely listening.     Goal of Group: Goals to increase relaxation; Increase self-expression; Increase sense of community; Increase socialization; Demonstrate positive use of time;       Comments:     Patient did not participate in Music Therapy group, despite staff encouragement and explanation of benefits.  Patient remain seclusive to self.  Q15 minute safety checks maintained for patient safety and will continue to encourage patient to attend unit programming.       
                                                                      Group Therapy Note    Date: 4/4/2024    Group Start Time: 1000  Group End Time: 1026  Group Topic: Psychoeducation    [facility] Juany Gee MSW        Group Therapy Note    Attendees: 5/9       Patient's Goal:  Patient will understand what gratitude means, why it is important, and ways to show gratitude in their everyday life    Notes: Group discussion on what gratitude is, why gratitude is important, and ways to show gratitude everyday.     Status After Intervention:  Unchanged    Participation Level: Minimal    Participation Quality: Resistant      Speech:  mute      Thought Process/Content: Flight of ideas      Affective Functioning: Constricted/Restricted      Mood: depressed      Level of consciousness:  Preoccupied      Response to Learning: Resistant      Endings: None Reported    Modes of Intervention: Education and Support      Discipline Responsible: /Counselor      Signature:  CARLOS Huynh    
                                                                      Group Therapy Note    Date: 4/4/2024    Group Start Time: 1100  Group End Time: 1130  Group Topic: Cognitive Skills    Sofy Lewis CTRS    Cognitive Skills Group Note        Date: April 4, 2024 Start Time: 11am  End Time: 11:30am      Number of Participants in Group & Unit Census:  2/9    Topic: cognitive skills     Goal of Group: pt will demonstrate improved concentration and improved decision making skills       Comments:     Patient did not participate in Cognitive Skills group, despite staff encouragement and explanation of benefits.  Patient remain seclusive to self.  Q15 minute safety checks maintained for patient safety and will continue to encourage patient to attend unit programming.              Signature:  BABAR FERNANDEZ    
                                                                      Group Therapy Note    Date: 4/5/2024    Group Start Time: 1030  Group End Time: 1100  Group Topic: Cognitive Skills    Sofy Lewis CTRS      Cognitive Skills Group Note        Date: April 5, 2024 Start Time:  1030   End Time:  1100 am      Number of Participants in Group & Unit Census:  1/9    Topic: cognitive skills     Goal of Group: pt will demonstrate improved communication skills and improved interpersonal relationships      Comments:     Patient did not participate in Cognitive Skills group, despite staff encouragement and explanation of benefits.  Patient remain seclusive to self.  Q15 minute safety checks maintained for patient safety and will continue to encourage patient to attend unit programming.              Signature:  BABAR FERNANDEZ    
                                                                      Group Therapy Note    Date: 4/8/2024    Group Start Time: 1100  Group End Time: 1155  Group Topic: Cognitive Skills    Tameka Medina CTRS        Group Therapy Note    Attendees: 4/9     Patient's Goal: To increase social interaction, practice decision making,and improve   concentration      Notes:  Pt was pleasant and cooperative, and participated fully in group task. Pt was   able to practice decision making skills, and concentrate on task with assistance in learning task   Steps/rules. RT and pt's 1:1 staff took turns giving pt simple choices \"this or that\" and explained   benefits of each choice each round. Pt brightened when making choices..       Status After Intervention:  Improved     Participation Level: Active Listener and Interactive r/t  task and topic, supportive      Participation Quality: Appropriate, Attentive, engaged r/t task and topic, supportive     Speech:  childlike but r/t task .       Thought Process/Content: [place] , linear r/t task and topic when decisions were broken down   into more simple choices by RT and 1:1 staff. Pt showed improvement in decision making based   on simple concepts of task . Pt required reminders to take only 3 turns-pt was cooperative with limit   setting in task.      Affective Functioning: Blunted, brightened.       Mood: Cooperative, euthymic. At one point pt very briefly had her hands in her lap, and started to   pick at a scab on hand but immediately stopped self and was cooperative with 1:1 staff when asked to   keep hands visible.       Level of consciousness:  Alert, and Attentive      Response to Learning: Able to practice and demonstrate some new learning with repetition and   simplification/explanation of choices r/t new learning, and Progressing to goal      Endings: None Reported      Modes of Intervention: Education, Support, Socialization, and Problem-solving    
                                                                      Group Therapy Note    Date: 4/9/2024    Group Start Time: 1000  Group End Time: 1030  Group Topic: Psychotherapy    [facility] B[facility]ia Lagunas MSW, CHANELL        Group Therapy Note    Attendees: 2/7       Patient's Goal:  Increase interpersonal relationship skills while discussing coping skills.    Status After Intervention:  Improved    Participation Level: Active Listener and Interactive    Participation Quality: Appropriate and Attentive      Speech:  normal      Thought Process/Content: Logical      Affective Functioning: Congruent      Mood: euthymic      Level of consciousness:  Alert and Attentive      Response to Learning: Able to verbalize current knowledge/experience and Able to verbalize/acknowledge new learning      Endings: None Reported    Modes of Intervention: Education, Support, and Socialization      Discipline Responsible: /Counselor      Signature:  CARLOS Bruno, CHANELL    
                                                      Group Therapy Note    Date: 4/2/2024    Group Start Time: 1050  Group End Time: 1135  Group Topic: Cognitive Skills    [facility]    Sofy Fowler CTRS        Group Therapy Note    Attendees: 5/8       Patient's Goal:  pt will demonstrate improved social skills and improved coping skills     Notes:   pt was pleasant and participated well    Status After Intervention:  Improved    Participation Level: Active Listener and Interactive    Participation Quality: Appropriate, Attentive, and Supportive      Speech: childlilke       Thought Process/Content:slow to process      Affective Functioning:flat      Mood anxious      Level of consciousness:  Alert      Response to Learning: Able to verbalize current knowledge/experience, Capable of insight, and Progressing to goal      Endings: None Reported    Modes of Intervention: Education, Support, and Activity      Discipline Responsible: Psychoeducational Specialist      Signature:  BABAR FERNANDEZ  
                                                    Group Therapy Note    Date: 4/1/2024    Group Start Time: 1100  Group End Time: 1140  Group Topic: Cognitive Skills    [facility]    Sofy Fowler CTRS        Group Therapy Note    Attendees: 6/10       Patient's Goal:  pt will demonstrate improved coping skills and improved decision making skills    Notes:   pt was pleasant and participated well    Status After Intervention:  Improved    Participation Level: Active Listener and Interactive    Participation Quality: Appropriate, Sharing, and Supportive      Speech:  normal      Thought Process/Content:slow to process thought blocked      Affective Functioning: Congruent      Mood: euthymic      Level of consciousness:  Alert      Response to Learning: Able to verbalize current knowledge/experience, Capable of insight, and Progressing to goal      Endings: None Reported    Modes of Intervention: Education, Support, and Activity      Discipline Responsible: Psychoeducational Specialist      Signature:  BABAR FERNANDEZ  
                                                    Group Therapy Note    Date: 4/1/2024    Group Start Time: 1330  Group End Time: 1410  Group Topic: psycheducation group     [facility]    Sofy Fowler CTRS        Group Therapy Note    Attendees: 6/8       Patient's Goal:  pt will demonstrate improved coping skills and improved communication skills     Notes:   pt was pleasant and participated well    Status After Intervention:  Improved    Participation Level: Active Listener and Interactive    Participation Quality: Appropriate, Sharing, and Supportive      Speech mumbles , childlike speech      Thought Process/Content:slow to process      Affective Functioning: flat      Mood:anxious      Level of consciousness:  Alert      Response to Learning: Able to verbalize current knowledge/experience, Capable of insight, and Progressing to goal      Endings: None Reported    Modes of Intervention: Education, Support, and Activity      Discipline Responsible: Psychoeducational Specialist      Signature:  BABAR FERNANDEZ  
                                                 Group Therapy Note    Date: 4/5/2024    Group Start Time: 1330  Group End Time: 1415  Group Topic: Relaxation    [facility] Soyf Cunningham CTRS        Group Therapy Note    Attendees: 3/9       Patient's Goal:  pt will demonstrate improved relaxation , improved self expression and improved coping skills    Notes:   pt was pleasant and participated well    Status After Intervention:  Improved    Participation Level: Active Listener and Interactive    Participation Quality: Appropriate       Speech:childlike       Thought Process/Content: slow to process       Affective Functioning: Congruent      Mood: euthymic      Level of consciousness:  Alert      Response to Learning: Able to verbalize current knowledge/experience, Capable of insight, and Progressing to goal      Endings: None Reported    Modes of Intervention: Support, Socialization, and Activity      Discipline Responsible: Psychoeducational Specialist      Signature:  BABAR FERNANDEZ  
                                              Group Therapy Note    Date: 3/30/2024    Group Start Time: 1330  Group End Time:1415  Group Topic: cognitive skills     [facility]    Sofy Fowler CTRS        Group Therapy Note    Attendees:5/8       Patient's Goal:  pt will demonstrate improved coping skills and improved interpersonal relationships     Notes:  pt was pleasant and participated well    Status After Intervention:  Improved    Participation Level: Active Listener and Interactive    Participation Quality: Appropriate, Attentive, and Supportive      Speech:  soft spoken, childlike       Thought Process/Content: slow to process      Affective Functioning: Congruent      Mood: euthymic      Level of consciousness:  Alert      Response to Learning: Able to verbalize current knowledge/experience, Capable of insight, and Progressing to goal      Endings: None Reported    Modes of Intervention: Education, Support, and Activity      Discipline Responsible: Psychoeducational Specialist      Signature:  BABAR FERNANDEZ  
Psych-Ed/Relapse Prevention Group Note        Date: April 9, 2024 Start Time: 11am  End Time: 11:30am      Number of Participants in Group & Unit Census:  4/7    Topic: Socialization skills    Goal of Group:Patient will demonstrate improved interpersonal skills      Comments:     Patient did not participate in Psych-Ed/Relapse Prevention group, despite staff encouragement and explanation of benefits.  Patient remain seclusive to self.  Q15 minute safety checks maintained for patient safety and will continue to encourage patient to attend unit programming.         Signature:  KEELY GutierrezS    
related to a peer sharing x2. Pt was excited r/t change in light from eclipse.      Level of consciousness:  Alert, and Attentive      Response to Learning: Able to verbalize current knowledge/experience, Able to   verbalize/acknowledge new learning, and Progressing to goal      Endings: None Reported      Modes of Intervention: Education, Support, Socialization, and Problem-solving    Discipline Responsible: Psychoeducational Specialist      Signature:  BABAR LEACH

## 2024-04-09 NOTE — CARE COORDINATION
Social work faxed paperwork to [employer] to schedule transportation to [place], [place].

## 2024-04-09 NOTE — CARE COORDINATION
Social work spoke with patients EFREN Sandy Luke at the [employer] to update her that [facility] will be here at 8:30 am on 4/10/24 to transport patient to [facility] Sandy is requesting paper scripts for the patient.     Social work updated the Legal Guardian of patients discharge tomorrow. Legal Guardian was agreeable to the discharge.

## 2024-04-09 NOTE — CARE COORDINATION
Social work spoke with Shy at [employer] who shared transportation will be here at 8:30 am to  the patient. Social work sent [employer]serve to doctor.

## 2024-04-09 NOTE — BH NOTE
Dr. Damian notified via [employer] that [facility] is asking for paper scripts and transport is scheduled for 0830 tomorrow.

## 2024-04-09 NOTE — CARE COORDINATION
Social work spoke with Sandy Geiger patients EFREN regarding discharge tomorrow. Sandy would l ethel to have the patients old group home [facility] drop off patients medications and clothes for the patient to take with her to new group home. Social work explained transportation will be arranged and Sandy will be updated on the transport time so she can coordinate the medications and clothes drop off.

## 2024-04-10 VITALS
BODY MASS INDEX: 34.15 KG/M2 | OXYGEN SATURATION: 100 % | SYSTOLIC BLOOD PRESSURE: 125 MMHG | DIASTOLIC BLOOD PRESSURE: 80 MMHG | HEART RATE: 101 BPM | HEIGHT: 64 IN | TEMPERATURE: 98.4 F | RESPIRATION RATE: 14 BRPM | WEIGHT: 200 LBS

## 2024-04-10 LAB
MISCELLANEOUS LAB TEST RESULT: NORMAL
TEST NAME: NORMAL

## 2024-04-10 PROCEDURE — 6370000000 HC RX 637 (ALT 250 FOR IP): Performed by: PSYCHIATRY & NEUROLOGY

## 2024-04-10 PROCEDURE — 6370000000 HC RX 637 (ALT 250 FOR IP): Performed by: NURSE PRACTITIONER

## 2024-04-10 PROCEDURE — 99239 HOSP IP/OBS DSCHRG MGMT >30: CPT | Performed by: PSYCHIATRY & NEUROLOGY

## 2024-04-10 RX ORDER — OLANZAPINE 15 MG/1
15 TABLET ORAL NIGHTLY
Qty: 30 TABLET | Refills: 0 | Status: SHIPPED | OUTPATIENT
Start: 2024-04-10

## 2024-04-10 RX ORDER — LEVOTHYROXINE SODIUM 0.05 MG/1
50 TABLET ORAL DAILY
Qty: 30 TABLET | Refills: 0 | Status: SHIPPED | OUTPATIENT
Start: 2024-04-10

## 2024-04-10 RX ORDER — CHOLECALCIFEROL (VITAMIN D3) 50 MCG
2000 TABLET ORAL EVERY MORNING
Qty: 30 TABLET | Refills: 0 | Status: SHIPPED | OUTPATIENT
Start: 2024-04-10

## 2024-04-10 RX ORDER — HYDROXYZINE 50 MG/1
50 TABLET, FILM COATED ORAL 3 TIMES DAILY PRN
Qty: 30 TABLET | Refills: 0 | Status: SHIPPED | OUTPATIENT
Start: 2024-04-10 | End: 2024-04-20

## 2024-04-10 RX ORDER — DIVALPROEX SODIUM 250 MG/1
750 TABLET, EXTENDED RELEASE ORAL 2 TIMES DAILY
Qty: 180 TABLET | Refills: 0 | Status: SHIPPED | OUTPATIENT
Start: 2024-04-10

## 2024-04-10 RX ORDER — OLANZAPINE 10 MG/1
10 TABLET ORAL EVERY MORNING
Qty: 30 TABLET | Refills: 0 | Status: SHIPPED | OUTPATIENT
Start: 2024-04-10

## 2024-04-10 RX ORDER — CYCLOBENZAPRINE HCL 10 MG
10 TABLET ORAL 3 TIMES DAILY PRN
Qty: 30 TABLET | Refills: 0 | Status: SHIPPED | OUTPATIENT
Start: 2024-04-10

## 2024-04-10 RX ORDER — LIDOCAINE 4 G/G
1 PATCH TOPICAL DAILY
Qty: 30 PATCH | Refills: 0 | Status: SHIPPED | OUTPATIENT
Start: 2024-04-10

## 2024-04-10 RX ORDER — SIMETHICONE 80 MG
80 TABLET,CHEWABLE ORAL 2 TIMES DAILY
Qty: 60 TABLET | Refills: 0 | Status: SHIPPED | OUTPATIENT
Start: 2024-04-10

## 2024-04-10 RX ORDER — SERTRALINE HYDROCHLORIDE 100 MG/1
100 TABLET, FILM COATED ORAL DAILY
Qty: 30 TABLET | Refills: 0 | Status: SHIPPED | OUTPATIENT
Start: 2024-04-10

## 2024-04-10 RX ORDER — TRAZODONE HYDROCHLORIDE 50 MG/1
50 TABLET ORAL NIGHTLY PRN
Qty: 30 TABLET | Refills: 0 | Status: SHIPPED | OUTPATIENT
Start: 2024-04-10

## 2024-04-10 RX ORDER — OMEPRAZOLE 20 MG/1
20 CAPSULE, DELAYED RELEASE ORAL DAILY
Qty: 30 CAPSULE | Refills: 0 | Status: SHIPPED | OUTPATIENT
Start: 2024-04-10

## 2024-04-10 RX ADMIN — Medication 2000 UNITS: at 08:22

## 2024-04-10 RX ADMIN — SIMETHICONE 80 MG: 80 TABLET, CHEWABLE ORAL at 08:22

## 2024-04-10 RX ADMIN — PANTOPRAZOLE SODIUM 40 MG: 40 TABLET, DELAYED RELEASE ORAL at 06:01

## 2024-04-10 RX ADMIN — SERTRALINE 100 MG: 100 TABLET, FILM COATED ORAL at 08:22

## 2024-04-10 RX ADMIN — OLANZAPINE 10 MG: 10 TABLET, FILM COATED ORAL at 08:22

## 2024-04-10 RX ADMIN — LEVOTHYROXINE SODIUM 50 MCG: 0.05 TABLET ORAL at 06:01

## 2024-04-10 RX ADMIN — DIVALPROEX SODIUM 750 MG: 500 TABLET, EXTENDED RELEASE ORAL at 08:22

## 2024-04-10 NOTE — PROCEDURES
[place] [address]                       ELECTROENCEPHALOGRAM REPORT      PATIENT NAME: SHAYY GUIDRY               : 1995  MED REC NO: [id number]                          ROOM: [id number]  ACCOUNT NO: [id number]                       ADMIT DATE: 2024  PROVIDER: Charly Rodriguez MD      DATE OF EE2024    INDICATION:  This is a 28-year-old lady with developmental delay, staring episodes, rule out seizure.    MEDICATIONS:  Include Depakote, Atarax, Desyrel, Flexeril, Synthroid, Zyprexa, Prilosec, Zoloft.    DESCRIPTION:  This is a 23-channel EEG and 1 EKG channel recording performed on a patient described to be awake, drowsy, and asleep.  The patient shows delayed waking rhythms.  Background activity consists of well-regulated 7 hertz activity in the 40 to 60 microvolt range, more prominent in the posterior head area, showing good reactivity to eye opening and closing.  Over the anterior head regions, there are 15 to 20 hertz activity in 20 to 30 microvolt range.  The record is prominent for mild-to-moderate medium amplitude 4 to 7 hertz activity seen throughout all head areas during waking state.  With drowsiness and sleep, there is further intrusion of slower frequencies in the theta and to the lesser degree in the delta band.    This record is not lateralized or epileptiform.  Hyperventilation is not performed.  Photic stimulation shows no change of the record.    IMPRESSION:  This EEG shows the presence of mild-to-moderate diffuse slowing.  This EEG is concordant with diffuse encephalopathy.  No clear lateralized or epileptiform disturbances seen.          CHARLY RODRIGUEZ MD      D:  2024 15:55:13     T:  2024 18:05:32     ETC/AQS  Job #:  [id number]     Doc#:  [id number]

## 2024-04-10 NOTE — DISCHARGE SUMMARY
Provider Discharge Summary     Patient ID:  Esmer Monzon  [id number]  28 y.o.  1995    Admit date: 3/28/2024    Discharge date and time: 4/10/2024  5:45 PM     Admitting Physician: Zac Damian MD     Discharge Physician: Zac Damian MD    Admission Diagnoses: Depression with suicidal ideation [F32.A, R45.851]    Discharge Diagnoses:      Schizoaffective disorder, depressive type (HCC)     Patient Active Problem List   Diagnosis Code    Depression with suicidal ideation F32.A, R45.851    Obesity (BMI 30-39.9) E66.9    Abnormal liver function R94.5    Fatty liver K76.0    Other specified hypothyroidism E03.8    Gastroesophageal reflux disease without esophagitis K21.9    Schizoaffective disorder, depressive type (HCC) F25.1        Admission Condition: poor    Discharged Condition: stable    Indication for Admission: threat to self    History of Present Illnes (present tense wording is of findings from admission exam and are not necessarily indicative of current findings):   Esmer Monzon is a 28 y.o. female who has a past medical history of schizoaffective disorder and intellectual disability. Patient presented to the ED [facility] application for emergency admission.  Documentation states that patient was experiencing command auditory hallucinations.  Making homicidal statements towards staff and was being aggressive.  Also cut her left forearm and has lacerations on this arm.  Patient unable to contract for safety in the community.  She does have a guardian, Jennifer Rock [phone].     On assessment, patient is sitting in the day area and is cooperative with discussion.  She exhibits significant thought blocking.  Has poor recall of recent and remote events.  Patient is oriented to self, month, year, and place.  She notes that she is admitted to the hospital after becoming distressed yesterday, banging on walls, throwing her shoes, and harming herself.  Patient unable to identify any specific trigger
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
## 2024-04-10 NOTE — DISCHARGE INSTRUCTIONS
Information:  Medications:   Medication summary provided   I understand that I should take only the medications on my list.     -why and when I need to take each medicine.     -which side effects to watch for.     -that I should carry my medication information at all times in case of     Emergency situations.    I will take all of my medicines to follow up appointments.     -check with my physician or pharmacist before taking any new    Medication, over the counter product or drink alcohol.    -Ask about food, drug or dietary supplement interactions.    -discard old lists and update records with medication providers.    Notify Physician:  Notify physician if you notice:   Always call 911 if you feel your life is in danger  In case of an emergency call 911 immediately!  If 911 is not available call your local emergency medical system for help    Behavioral Health Follow Up:  Original Referral Source: [facility]  Discharge Diagnosis: Depression with suicidal ideation [F32.A, R45.851]  Recommendations for Level of Care: Follow up with community mental health clinic for appointments and medications.   Patient status at discharge: Stable on medications   My hospital  was: [facility]  Prescriptions: Paper scripts sent home with patient     Smoking: Quit Smoking.   Call the [employer]'s smoking quitline at 1-288-66G-QUIT  Know the signs of a heart attack   If you have any of the following symptoms call 911 immediately, do not wait more    Than five minutes.    1. Pressure, fullness and/ or squeezing in the center of the chest spreading to    The jaw, neck or shoulder.    2. Chest discomfort with light headedness, fainting, sweating, nausea or    Shortness of breath.   3. Upper abdominal pressure or discomfort.   4. Lower chest pain, back pain, unusual fatigue, shortness of breath, nausea   Or dizziness.     General Information:   Questions regarding your bill: Call HELP program [phone]     Suicide Hotline (Rescue Crisis)

## 2024-04-10 NOTE — TRANSITION OF CARE
Behavioral Health Transition Record to Provider    Patient Name: Esmer Monzon  YOB: 1995   Medical Record Number: [id number]  Date of Admission: 3/28/2024 12:49 AM   Date of Discharge: 4/10/24    Attending Provider: Zac Damian MD   Discharging Provider: Dr. Damian  To contact this individual call [phone] and ask the  to page.  If unavailable, ask to be transferred to Behavioral Health Provider on call.  A Behavioral Health Provider will be available on call 24/7 and during holidays.    Primary Care Provider: No primary care provider on file.    Allergies   Allergen Reactions    Ibuprofen Rash    Robitussin Cold Cough+ Chest [Dextromethorphan-Guaifenesin] Hallucinations     Per Legal Guardian, patient is allergic to all kinds of Robitussin cough medicine products.       Reason for Admission: Increased homicidal ideation and aggressive behaviors towards staff at group home.     Admission Diagnosis: Depression with suicidal ideation [F32.A, R45.851]    * No surgery found *    Results for orders placed or performed during the hospital encounter of 03/28/24   CBC with Auto Differential   Result Value Ref Range    WBC 9.8 3.5 - 11.0 k/uL    RBC 4.98 4.0 - 5.2 m/uL    Hemoglobin 13.0 12.0 - 16.0 g/dL    Hematocrit 40.3 36 - 46 %    MCV 81.1 80 - 100 fL    MCH 26.1 26 - 34 pg    MCHC 32.2 31 - 37 g/dL    RDW 13.4 11.5 - 14.9 %    Platelets 263 150 - 450 k/uL    MPV 7.5 6.0 - 12.0 fL    Neutrophils % 68 (H) 36 - 66 %    Lymphocytes % 22 (L) 24 - 44 %    Monocytes % 8 (H) 1 - 7 %    Eosinophils % 1 0 - 4 %    Basophils % 1 0 - 2 %    Neutrophils Absolute 6.60 1.3 - 9.1 k/uL    Lymphocytes Absolute 2.10 1.0 - 4.8 k/uL    Monocytes Absolute 0.80 0.1 - 1.3 k/uL    Eosinophils Absolute 0.10 0.0 - 0.4 k/uL    Basophils Absolute 0.10 0.0 - 0.2 k/uL   Comprehensive Metabolic Panel   Result Value Ref Range    Sodium 140 135 - 144 mmol/L    Potassium 3.8 3.7 - 5.3 mmol/L    Chloride 102 98 - 107

## 2024-04-10 NOTE — PLAN OF CARE
Problem: Depression  Goal: Will be euthymic at discharge  Description: INTERVENTIONS:  1. Administer medication as ordered  2. Provide emotional support via 1:1 interaction with staff  3. Encourage involvement in milieu/groups/activities  4. Monitor for social isolation  3/29/2024 2112 by Yane Mann  Outcome: Progressing  Note: Pt denies thoughts of self harm and is agreeable to seeking out should thoughts of self harm arise.  Safe environment maintained.  Q15 minute checks for safety cont per unit policy.  Will cont to monitor for safety and provides support and reassurance as needed.       Problem: Behavior  Goal: Pt/Family maintain appropriate behavior and adhere to behavioral management agreement, if implemented  Description: INTERVENTIONS:  1. Assess patient/family's coping skills and  non-compliant behavior (including use of illegal substances)  2. Notify security of behavior or suspected illegal substances which indicate the need for search of the family and/or belongings  3. Encourage verbalization of thoughts and concerns in a socially appropriate manner  4. Utilize positive, consistent limit setting strategies supporting safety of patient, staff and others  5. Encourage participation in the decision making process about the behavioral management agreement  6. If a visitor's behavior poses a threat to safety call refer to organization policy.  7. Initiate consult with , Psychosocial CNS, Spiritual Care as appropriate  3/29/2024 2112 by Yane Mann  Outcome: Progressing  Note: Out in the milieu, paces. Denies suicidal thoughts at this time. Cooperative. Childlike and thought blocking. Compliant with all prescribed medications for this shift. Showered. Says her family told her she is going back to the same group home and she doesn't want to. Safety checks maintained q15min and irregular rounding.      
  Problem: Depression  Goal: Will be euthymic at discharge  Description: INTERVENTIONS:  1. Administer medication as ordered  2. Provide emotional support via 1:1 interaction with staff  3. Encourage involvement in milieu/groups/activities  4. Monitor for social isolation  3/30/2024 1544 by Carlotta Thakur LPN  Outcome: Progressing     Problem: Psychosis  Goal: Will report no hallucinations or delusions  Description: INTERVENTIONS:  1. Administer medication as  ordered  2. Assist with reality testing to support increasing orientation  3. Assess if patient's hallucinations or delusions are encouraging self harm or harm to others and intervene as appropriate  3/30/2024 1544 by Carlotta Thakur LPN  Outcome: Progressing     Problem: Self Harm/Suicidality  Goal: Will have no self-injury during hospital stay  Description: INTERVENTIONS:  1.  Ensure constant observer at bedside with Q15M safety checks  2.  Maintain a safe environment  3.  Secure patient belongings  4.  Ensure family/visitors adhere to safety recommendations  5.  Ensure safety tray has been added to patient's diet order  6.  Every shift and PRN: Re-assess suicidal risk via Frequent Screener    3/30/2024 1544 by Carlotta Thakur LPN  Outcome: Progressing  Note: Patient denies suicidal/homicidal ideation. Patient agreeable to seek out staff should thoughts of self harm/harming others arise. Patient denies hallucinations. Patient is positive for anxiety/depression but does not rate. Patient is childlike and thought blocking, slow and sometimes no responses to questions. Patient did state she had thoughts of cutting herself, patient was open to 1:1 talk time, but did not engage in conversation. As needed medication given to help relieve anxiety with good effect. Patient is medication compliant and behavior controlled. Comfort and reassurance provided. Safety checks maintained every 15 minutes and as  needed.       
  Problem: Depression  Goal: Will be euthymic at discharge  Description: INTERVENTIONS:  1. Administer medication as ordered  2. Provide emotional support via 1:1 interaction with staff  3. Encourage involvement in milieu/groups/activities  4. Monitor for social isolation  Outcome: Not Progressing     Problem: Psychosis  Goal: Will report no hallucinations or delusions  Description: INTERVENTIONS:  1. Administer medication as  ordered  2. Assist with reality testing to support increasing orientation  3. Assess if patient's hallucinations or delusions are encouraging self harm or harm to others and intervene as appropriate  4/3/2024 1616 by Cassy Branham LPN  Outcome: Not Progressing  Note: Patient endorses auditory command hallucinations to harm herself, but has no plan or intention on acting on them and contracts for safety while on unit. She also endorses anxiety and depression, having occasional bursts of crying and becoming really sad about wanting to move units. She has maintained behavorial control today, however did require emergency medications yesterday for self harming behaviors. She is flat in affect, withdraw from her peers and doesn't show much interest in any activities or group.   4/3/2024 0400 by Loyda Russell, RN  Note: When pt is asked about hallucinations pt states \"sometimes\" pt is unable to describe. PT eyes are darting around room as we speak and pt appears preoccupied. Pt is slow to respond to assessment questions. PT remains disheveled. PT maintained on 15 min safety checks and rounds at irregular intervals.        
  Problem: Psychosis  Goal: Will report no hallucinations or delusions  Description: INTERVENTIONS:  1. Administer medication as  ordered  2. Assist with reality testing to support increasing orientation  3. Assess if patient's hallucinations or delusions are encouraging self harm or harm to others and intervene as appropriate  4/9/2024 2045 by Gabe Gutierrez  Outcome: Progressing     Problem: Behavior  Goal: Pt/Family maintain appropriate behavior and adhere to behavioral management agreement, if implemented  Description: INTERVENTIONS:  1. Assess patient/family's coping skills and  non-compliant behavior (including use of illegal substances)  2. Notify security of behavior or suspected illegal substances which indicate the need for search of the family and/or belongings  3. Encourage verbalization of thoughts and concerns in a socially appropriate manner  4. Utilize positive, consistent limit setting strategies supporting safety of patient, staff and others  5. Encourage participation in the decision making process about the behavioral management agreement  6. If a visitor's behavior poses a threat to safety call refer to organization policy.  7. Initiate consult with , Psychosocial CNS, Spiritual Care as appropriate  4/9/2024 2045 by Gabe Gutierrez  Outcome: Progressing     Patient is currently in her room behaving. Patient is compliant with scheduled medications and has remained in behavioral control thus far. Patient's mood is normal, affect is flat. Patient's thought process is normal and she is excited to be leaving. Patient understands that if you do good things you get good things. Patient denies hallucinations and delusions. Patient denies suicidal and homicidal ideation, plan or intent, and remains free of self harm. Patient's pain is currently WDL per patient and will continue to be assessed. Safety maintained with every 15 minute checks.    
  Problem: Self Harm/Suicidality  Goal: Will have no self-injury during hospital stay  Description: INTERVENTIONS:  1.  Ensure constant observer at bedside with Q15M safety checks  2.  Maintain a safe environment  3.  Secure patient belongings  4.  Ensure family/visitors adhere to safety recommendations  5.  Ensure safety tray has been added to patient's diet order  6.  Every shift and PRN: Re-assess suicidal risk via Frequent Screener    3/29/2024 0014 by Alisa Calle, RN  Outcome: Progressing  Flowsheets (Taken 3/28/2024 2110)  Will have no self-injury during hospital stay:   Maintain a safe environment   Every shift and PRN: Re-assess suicidal risk via Frequent Screener  Note: Pt denies any suicidal or homicidal ideations, denies any hallucinations. Pt agreed to seek staff and report any intrusive thoughts of hurting self or others.  Pt remains free from any self-harm, safe environment maintained. Regular rounding 4 times an hour and spontaneous patient checks done for safety and per unit policy.        Problem: Depression  Goal: Will be euthymic at discharge  Description: INTERVENTIONS:  1. Administer medication as ordered  2. Provide emotional support via 1:1 interaction with staff  3. Encourage involvement in milieu/groups/activities  4. Monitor for social isolation  3/29/2024 0014 by Alisa Calle, RN  Outcome: Progressing  Note: Patient's mood remains sad and anxious, but interactive during shift assessment. Patient out in day room listening to music, making phone calls at times.      Problem: Psychosis  Goal: Will report no hallucinations or delusions  Description: INTERVENTIONS:  1. Administer medication as  ordered  2. Assist with reality testing to support increasing orientation  3. Assess if patient's hallucinations or delusions are encouraging self harm or harm to others and intervene as appropriate  3/29/2024 0047 by Alisa Calle, RN  Outcome: Progressing  Note: Patient denies any 
  Problem: Self Harm/Suicidality  Goal: Will have no self-injury during hospital stay  Description: INTERVENTIONS:  1.  Ensure constant observer at bedside with Q15M safety checks  2.  Maintain a safe environment  3.  Secure patient belongings  4.  Ensure family/visitors adhere to safety recommendations  5.  Ensure safety tray has been added to patient's diet order  6.  Every shift and PRN: Re-assess suicidal risk via Frequent Screener    3/31/2024 0826 by Zuly Cordoba LPN  Outcome: Progressing  Note: Patient endorses fleeting Suicidal thoughts with no plan. Contracts for safety while on unit. Patient depressed and anxious. Flat and preoccupied. Thought blocking. Pt. Isolative to self and is out for needs. Pt. Compliant with medications and staff.      
  Problem: Self Harm/Suicidality  Goal: Will have no self-injury during hospital stay  Description: INTERVENTIONS:  1.  Ensure constant observer at bedside with Q15M safety checks  2.  Maintain a safe environment  3.  Secure patient belongings  4.  Ensure family/visitors adhere to safety recommendations  5.  Ensure safety tray has been added to patient's diet order  6.  Every shift and PRN: Re-assess suicidal risk via Frequent Screener    4/1/2024 0923 by Shannan Jimenez RN  Outcome: Progressing     Problem: Depression  Goal: Will be euthymic at discharge  Description: INTERVENTIONS:  1. Administer medication as ordered  2. Provide emotional support via 1:1 interaction with staff  3. Encourage involvement in milieu/groups/activities  4. Monitor for social isolation  4/1/2024 0923 by Shannan Jimenez RN  Outcome: Progressing     Problem: Psychosis  Goal: Will report no hallucinations or delusions  Description: INTERVENTIONS:  1. Administer medication as  ordered  2. Assist with reality testing to support increasing orientation  3. Assess if patient's hallucinations or delusions are encouraging self harm or harm to others and intervene as appropriate  4/1/2024 0923 by Shannan Jimenez RN  Outcome: Progressing     Problem: Behavior  Goal: Pt/Family maintain appropriate behavior and adhere to behavioral management agreement, if implemented  Description: INTERVENTIONS:  1. Assess patient/family's coping skills and  non-compliant behavior (including use of illegal substances)  2. Notify security of behavior or suspected illegal substances which indicate the need for search of the family and/or belongings  3. Encourage verbalization of thoughts and concerns in a socially appropriate manner  4. Utilize positive, consistent limit setting strategies supporting safety of patient, staff and others  5. Encourage participation in the decision making process about the behavioral management agreement  6. If a visitor's 
  Problem: Self Harm/Suicidality  Goal: Will have no self-injury during hospital stay  Description: INTERVENTIONS:  1.  Ensure constant observer at bedside with Q15M safety checks  2.  Maintain a safe environment  3.  Secure patient belongings  4.  Ensure family/visitors adhere to safety recommendations  5.  Ensure safety tray has been added to patient's diet order  6.  Every shift and PRN: Re-assess suicidal risk via Frequent Screener    4/3/2024 0400 by Loyda Russell, RN  Note: When asked about suicidal thoughts pt states \"I don't know sometimes\". PT rates depression and anxiety a 2 out of 10. PT denies any current plan. PT states she came in because she got angry at home and threw a chair. Pt states she gets angry a lot. Pt appears anxious during talk time and is pinching her self when talking about her anger. Pt has maintained behavioral control this shift and spent majority coloring of talk with 1:1 staff member. PT has appeared to have rested well.      Problem: Psychosis  Goal: Will report no hallucinations or delusions  Description: INTERVENTIONS:  1. Administer medication as  ordered  2. Assist with reality testing to support increasing orientation  3. Assess if patient's hallucinations or delusions are encouraging self harm or harm to others and intervene as appropriate  4/3/2024 0400 by Loyda Russell, RN  Note: When pt is asked about hallucinations pt states \"sometimes\" pt is unable to describe. PT eyes are darting around room as we speak and pt appears preoccupied. Pt is slow to respond to assessment questions. PT remains disheveled. PT maintained on 15 min safety checks and rounds at irregular intervals.        Problem: Self Harm/Suicidality  Goal: Will have no self-injury during hospital stay  Description: INTERVENTIONS:  1.  Ensure constant observer at bedside with Q15M safety checks  2.  Maintain a safe environment  3.  Secure patient belongings  4.  Ensure family/visitors adhere to 
  Problem: Self Harm/Suicidality  Goal: Will have no self-injury during hospital stay  Description: INTERVENTIONS:  1.  Ensure constant observer at bedside with Q15M safety checks  2.  Maintain a safe environment  3.  Secure patient belongings  4.  Ensure family/visitors adhere to safety recommendations  5.  Ensure safety tray has been added to patient's diet order  6.  Every shift and PRN: Re-assess suicidal risk via Frequent Screener    4/4/2024 0506 by Loyda Russell RN  Note: Pt denies thoughts of self harm and is agreeable to seeking out should thoughts of self harm arise.  Safe environment maintained.  Q15 minute checks for safety cont per unit policy.  Will cont to monitor for safety and provides support and reassurance as needed.         Problem: Psychosis  Goal: Will report no hallucinations or delusions  Description: INTERVENTIONS:  1. Administer medication as  ordered  2. Assist with reality testing to support increasing orientation  3. Assess if patient's hallucinations or delusions are encouraging self harm or harm to others and intervene as appropriate  4/4/2024 0506 by Loyda Russell RN  Note: Pt admits to auditory hallucinations but is unable to say what she is hearing. Pt is taking ordered medications.      Problem: Behavior  Goal: Pt/Family maintain appropriate behavior and adhere to behavioral management agreement, if implemented  Description: INTERVENTIONS:  1. Assess patient/family's coping skills and  non-compliant behavior (including use of illegal substances)  2. Notify security of behavior or suspected illegal substances which indicate the need for search of the family and/or belongings  3. Encourage verbalization of thoughts and concerns in a socially appropriate manner  4. Utilize positive, consistent limit setting strategies supporting safety of patient, staff and others  5. Encourage participation in the decision making process about the behavioral management agreement  6. If 
  Problem: Self Harm/Suicidality  Goal: Will have no self-injury during hospital stay  Description: INTERVENTIONS:  1.  Ensure constant observer at bedside with Q15M safety checks  2.  Maintain a safe environment  3.  Secure patient belongings  4.  Ensure family/visitors adhere to safety recommendations  5.  Ensure safety tray has been added to patient's diet order  6.  Every shift and PRN: Re-assess suicidal risk via Frequent Screener    4/4/2024 0928 by Latosha Farnsworth LPN  Outcome: Progressing     Problem: Depression  Goal: Will be euthymic at discharge  Description: INTERVENTIONS:  1. Administer medication as ordered  2. Provide emotional support via 1:1 interaction with staff  3. Encourage involvement in milieu/groups/activities  4. Monitor for social isolation  Outcome: Progressing   Patient denies experiencing feelings of depression or anxiety, thoughts of wanting to harm themself or others, as well as hallucinations of any kind. Patient is polite and cooperative with staff. Patient is medication compliant and remains behavior controlled. Patient is able to complete activities of daily living without any supervision or assistance. Patient appears mildly brightened. Patient states that they are sleeping and eating sufficiently. Patient is selectively social with their peers when she is in the dayroom. Patient has not been attending group therapy sessions and unit programming, and participates in productive and meaningful ways.  Patient is comfortable approaching staff for any needs or requests. Patient agrees to notify staff if any thoughts, feelings, or concerns need to be brought to their attention. Q15 minute safety checks maintained.      Problem: Psychosis  Goal: Will report no hallucinations or delusions  Description: INTERVENTIONS:  1. Administer medication as  ordered  2. Assist with reality testing to support increasing orientation  3. Assess if patient's hallucinations or delusions are 
  Problem: Self Harm/Suicidality  Goal: Will have no self-injury during hospital stay  Description: INTERVENTIONS:  1.  Ensure constant observer at bedside with Q15M safety checks  2.  Maintain a safe environment  3.  Secure patient belongings  4.  Ensure family/visitors adhere to safety recommendations  5.  Ensure safety tray has been added to patient's diet order  6.  Every shift and PRN: Re-assess suicidal risk via Frequent Screener    4/5/2024 0832 by Lydia Castillo  Outcome: Not Progressing  4/5/2024 0026 by Eboni Bhatt RN  Outcome: Progressing     Problem: Depression  Goal: Will be euthymic at discharge  Description: INTERVENTIONS:  1. Administer medication as ordered  2. Provide emotional support via 1:1 interaction with staff  3. Encourage involvement in milieu/groups/activities  4. Monitor for social isolation  4/5/2024 0832 by Lydia Castillo  Outcome: Not Progressing  4/5/2024 0026 by Eboni Bhatt RN  Outcome: Progressing     Problem: Psychosis  Goal: Will report no hallucinations or delusions  Description: INTERVENTIONS:  1. Administer medication as  ordered  2. Assist with reality testing to support increasing orientation  3. Assess if patient's hallucinations or delusions are encouraging self harm or harm to others and intervene as appropriate  4/5/2024 0832 by Lydia Castillo  Outcome: Not Progressing     Problem: Behavior  Goal: Pt/Family maintain appropriate behavior and adhere to behavioral management agreement, if implemented  Description: INTERVENTIONS:  1. Assess patient/family's coping skills and  non-compliant behavior (including use of illegal substances)  2. Notify security of behavior or suspected illegal substances which indicate the need for search of the family and/or belongings  3. Encourage verbalization of thoughts and concerns in a socially appropriate manner  4. Utilize positive, consistent limit setting strategies supporting safety of patient, staff and others  5. 
  Problem: Self Harm/Suicidality  Goal: Will have no self-injury during hospital stay  Description: INTERVENTIONS:  1.  Ensure constant observer at bedside with Q15M safety checks  2.  Maintain a safe environment  3.  Secure patient belongings  4.  Ensure family/visitors adhere to safety recommendations  5.  Ensure safety tray has been added to patient's diet order  6.  Every shift and PRN: Re-assess suicidal risk via Frequent Screener    4/6/2024 0932 by Lydia Metz RN  Outcome: Progressing  Note: Ms. Monzon denies thoughts of self harm at time of assessment. Patient is compliant with medications and assessment. Patient reports she is looking forward to returning to her group home.   4/6/2024 0434 by Silvana Mauro LPN  Outcome: Progressing     Problem: Depression  Goal: Will be euthymic at discharge  Description: INTERVENTIONS:  1. Administer medication as ordered  2. Provide emotional support via 1:1 interaction with staff  3. Encourage involvement in milieu/groups/activities  4. Monitor for social isolation  4/6/2024 0932 by Lydia Metz RN  Outcome: Progressing  Note: Ms. Monzon reports her depression is improving. She is compliant with medications and assessment, social with select peers and staff.   4/6/2024 0434 by Silvana Mauro LPN  Outcome: Progressing     Problem: Psychosis  Goal: Will report no hallucinations or delusions  Description: INTERVENTIONS:  1. Administer medication as  ordered  2. Assist with reality testing to support increasing orientation  3. Assess if patient's hallucinations or delusions are encouraging self harm or harm to others and intervene as appropriate  4/6/2024 0932 by Lydia Metz RN  Outcome: Progressing  Note: Ms. Monzon denies auditory and visual hallucinations at time of assessment. There are no witnessed periods of responding.   4/6/2024 0434 by Silvana Mauro LPN  Outcome: Progressing     Problem: Behavior  Goal: Pt/Family maintain 
  Problem: Self Harm/Suicidality  Goal: Will have no self-injury during hospital stay  Description: INTERVENTIONS:  1.  Ensure constant observer at bedside with Q15M safety checks  2.  Maintain a safe environment  3.  Secure patient belongings  4.  Ensure family/visitors adhere to safety recommendations  5.  Ensure safety tray has been added to patient's diet order  6.  Every shift and PRN: Re-assess suicidal risk via Frequent Screener    4/8/2024 1407 by Cassy Branham LPN  Outcome: Progressing  Note: Patient continues to require a 1:1 staff for self-harming and aggressive behaviors, however she had no incidents today. She endorses anxiety, states \"maybe a little\" depression due to wanting to go back home and see her friends. She is mostly flat in appearance, however does brighten when engaging with staff and peers. Patient is only oriented to person, and shows poor insight into an understanding of her mental health, but is overall cooperative with staff. She denies any delusions or hallucinations and no self-talk or bizarre behavior is observed throughout shift. Patient denies any suicidal/homicidal ideation at this time. Will continue to monitor, assess, and provide a safe environment through Q15 minute safety checks.   4/8/2024 1407 by Cassy Branham LPN  Outcome: Progressing     Problem: Psychosis  Goal: Will report no hallucinations or delusions  Description: INTERVENTIONS:  1. Administer medication as  ordered  2. Assist with reality testing to support increasing orientation  3. Assess if patient's hallucinations or delusions are encouraging self harm or harm to others and intervene as appropriate  4/8/2024 1407 by Cassy Branham LPN  Outcome: Progressing  4/8/2024 1407 by Cassy Branham LPN  Outcome: Progressing     Problem: Behavior  Goal: Pt/Family maintain appropriate behavior and adhere to behavioral management agreement, if implemented  Description: INTERVENTIONS:  1. Assess 
  Problem: Self Harm/Suicidality  Goal: Will have no self-injury during hospital stay  Description: INTERVENTIONS:  1.  Ensure constant observer at bedside with Q15M safety checks  2.  Maintain a safe environment  3.  Secure patient belongings  4.  Ensure family/visitors adhere to safety recommendations  5.  Ensure safety tray has been added to patient's diet order  6.  Every shift and PRN: Re-assess suicidal risk via Frequent Screener    4/9/2024 0343 by Dc Peace  Outcome: Progressing     Problem: Behavior  Goal: Pt/Family maintain appropriate behavior and adhere to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 strategies supporting safety of patient, staff and others  5. Encourage participation in the decision making process about the behavioral management agreement  6. If a visitor's behavior poses a threat to safety call refer to organization policy.  7. Initiate consult with , Psychosocial CNS, Spiritual Care as appropriate  3/31/2024 0025 by Kinajl Thakur LPN  Outcome: Progressing  Note: Patient 
DAVID Freeman  Outcome: Progressing  Note: Ms. Monzon is appropriate on unit. She is calm and cooperative, eating meals in the dayroom  4/6/2024 2343 by Vicky Price LPN  Outcome: Progressing  Note: Patient cooperative with assessments and medications this shift. Patient 1:1 continued for safety this shift.   Goal: No signs of behavioral stress  Description: No signs of behavioral stress related to phone privileges.   4/7/2024 0944 by Lydia Metz RN  Outcome: Progressing  4/7/2024 0910 by Lydia Metz RN  Outcome: Progressing  4/6/2024 2343 by Vicky Price LPN  Outcome: Progressing     Problem: Behavior  Goal: No signs of behavioral stress  Description: No signs of behavioral stress related to phone privileges.   4/7/2024 0944 by Lydia Metz RN  Outcome: Progressing  4/7/2024 0910 by Lydia Metz RN  Outcome: Progressing  4/6/2024 2343 by Vicky Price LPN  Outcome: Progressing     Problem: Pain  Goal: Verbalizes/displays adequate comfort level or baseline comfort level  4/7/2024 0944 by Lydia Metz RN  Outcome: Progressing  4/7/2024 0910 by Lydia Metz RN  Outcome: Progressing  4/6/2024 2343 by Vicky Price LPN  Outcome: Progressing  Note: Patient admits to pain this shift. Patient states pain at a 3 on a 0-10 scale. Patient given prn tylenol for pain. Patient educated and agrees to come to staff if pain gets worse or does not go away. Patient 1:1 continued for safety. Patient monitored every 15 minutes with environmental safety checks.      
Goals:  5-7 days    LONG-TERM GOALS UPDATE:   Time frame for Long-Term Goals:  6 months    Members Present in Team Meeting:   See multidisciplinary team  signature sheet.    Latosha Gardiner RN   
milieu/groups/activities  4. Monitor for social isolation  4/5/2024 0832 by Lyida Castillo  Outcome: Not Progressing  4/5/2024 0026 by Eboni Bhatt, RN  Outcome: Progressing     Problem: Psychosis  Goal: Will report no hallucinations or delusions  Description: INTERVENTIONS:  1. Administer medication as  ordered  2. Assist with reality testing to support increasing orientation  3. Assess if patient's hallucinations or delusions are encouraging self harm or harm to others and intervene as appropriate  4/5/2024 0832 by Lydia Castillo  Outcome: Not Progressing     Problem: Behavior  Goal: Pt/Family maintain appropriate behavior and adhere to behavioral management agreement, if implemented  Description: INTERVENTIONS:  1. Assess patient/family's coping skills and  non-compliant behavior (including use of illegal substances)  2. Notify security of behavior or suspected illegal substances which indicate the need for search of the family and/or belongings  3. Encourage verbalization of thoughts and concerns in a socially appropriate manner  4. Utilize positive, consistent limit setting strategies supporting safety of patient, staff and others  5. Encourage participation in the decision making process about the behavioral management agreement  6. If a visitor's behavior poses a threat to safety call refer to organization policy.  7. Initiate consult with , Psychosocial CNS, Spiritual Care as appropriate  4/5/2024 0832 by Lydia Castillo  Outcome: Not Progressing  4/5/2024 0026 by Eboni Bhatt, RN  Outcome: Not Progressing     
Violent/Self-destructive Restraint  Goal: Remains free of injury from restraints (Restraint for Violent/Self-Destructive Behavior)  Description: INTERVENTIONS:  1. Determine that de-escalation and other, less restrictive measures have been tried or would not be effective before applying the restraint  2. Identify and document the criteria for restraint  3. Evaluate the patient's condition at the time of restraint application  4. Inform patient/family regarding the reason for restraint/seclusion  5. Q2H: Monitor comfort, nutrition and hydration needs  6. Q15M: Perform safety checks including skin, circulation, sensory, respiratory and psychological status  7. Ensure continuous observation  8. Identify and implement measures to help patient regain control, assess readiness for release and initiate progressive release per policy  4/1/2024 2027 by Ya Balderas LPN  Outcome: Not Progressing  Flowsheets (Taken 4/1/2024 1948 by Trevin Wilkins RN)  Remains Free of Injury from Restraints (Restraint for Violent/Self-destructive Behavior):   Determine that de-escalation and other, less restrictive measures have been tried or would not be effective before applying the restraint   Identify and document the criteria for restraint   Ensure continuous observation  Note: Patient continues violent behaviors at this time. Patient has been agreeable to medication and remains uncooperative with staff and other patients. Patient educated on multiple alternative options to distract her from behaviors leading to seclusion. Staff continues to encourage patient to voice measures creating self injury and patient harm. Patient agrees to voice frustrations that cause harm to self, others and destruction of property. Staff will continue to monitor patient for warning signs that may lead to aggression and/or violent behaviors. Staff ensures safety by providing safety checks on the unit intermittently and every 15 minutes. Staff continues to

## 2024-04-10 NOTE — BH NOTE
[facility]  Discharge Note    Pt discharged with followings belongings:   Dental Appliances: None  Vision - Corrective Lenses: None  Hearing Aid: None  Jewelry: None  Body Piercings Removed: N/A  Clothing: Dress, Undergarments  Other Valuables: Other (Comment) (None)     Valuables and paper scripts sent with patient, transported to new group home by [facility].     Patient educated on aftercare instructions: Follow up with [facility] mental [facility] clinic for appointments and medications. Patient verbalize understanding of AVS:  Yes.    Status EXAM upon discharge:  Mental Status and Behavioral Exam  Normal: No  Level of Assistance: Independent/Self  Facial Expression: Flat, Brightened  Affect: Appropriate, Blunt  Level of Consciousness: Alert  Frequency of Checks: 1 staff: 1 patient  - continuous  Mood:Normal: No  Mood: Anxious (per pt \"good\")  Motor Activity:Normal: Yes  Motor Activity: Decreased  Eye Contact: Fair  Observed Behavior: Cooperative, Friendly  Sexual Misconduct History: Current - no  Preception: [place] to person, [place] to time, [place] to place  Attention:Normal: Yes  Attention: Unable to concentrate  Thought Processes: Unremarkable  Thought Content:Normal: No  Thought Content: Preoccupations  Depression Symptoms: Impaired concentration  Anxiety Symptoms: Generalized  Ariela Symptoms: No problems reported or observed.  Hallucinations: None  Delusions: No  Delusions: Paranoid  Memory:Normal: No  Memory: Poor recent, Poor remote  Insight and Judgment: No  Insight and Judgment: Poor judgment, Poor insight    Tobacco Screening:  Practical Counseling, on admission, darrick X, if applicable and completed (first 3 are required if patient doesn't refuse):            ( ) Recognizing danger situations (included triggers and roadblocks)                    ( ) Coping skills (new ways to manage stress,relaxation techniques, changing routine, distraction)